# Patient Record
Sex: MALE | Race: WHITE | HISPANIC OR LATINO | Employment: UNEMPLOYED | URBAN - METROPOLITAN AREA
[De-identification: names, ages, dates, MRNs, and addresses within clinical notes are randomized per-mention and may not be internally consistent; named-entity substitution may affect disease eponyms.]

---

## 2024-08-19 ENCOUNTER — TELEPHONE (OUTPATIENT)
Dept: CARDIOLOGY | Facility: CLINIC | Age: 64
End: 2024-08-19
Payer: MEDICARE

## 2024-08-19 NOTE — TELEPHONE ENCOUNTER
Attempted to contact pt in regards to lab results pt's wife answered the phone I did aware pt's wife of lab results and she stated that I would have to call the rehab facility because he wasn't there I asked her for the number to contact them pt's wife hung up the phone.

## 2024-08-20 ENCOUNTER — HOSPITAL ENCOUNTER (INPATIENT)
Facility: HOSPITAL | Age: 64
LOS: 2 days | Discharge: HOME HEALTH | DRG: 291 | End: 2024-08-22
Attending: EMERGENCY MEDICINE | Admitting: EMERGENCY MEDICINE
Payer: MEDICARE

## 2024-08-20 DIAGNOSIS — I50.43 ACUTE ON CHRONIC COMBINED SYSTOLIC AND DIASTOLIC CONGESTIVE HEART FAILURE: ICD-10-CM

## 2024-08-20 DIAGNOSIS — E87.70 HYPERVOLEMIA, UNSPECIFIED HYPERVOLEMIA TYPE: Primary | ICD-10-CM

## 2024-08-20 DIAGNOSIS — E78.5 HYPERLIPIDEMIA ASSOCIATED WITH TYPE 2 DIABETES MELLITUS: ICD-10-CM

## 2024-08-20 DIAGNOSIS — N18.6 ESRD NEEDING DIALYSIS: ICD-10-CM

## 2024-08-20 DIAGNOSIS — K21.9 GASTROESOPHAGEAL REFLUX DISEASE: ICD-10-CM

## 2024-08-20 DIAGNOSIS — I10 ESSENTIAL HYPERTENSION: ICD-10-CM

## 2024-08-20 DIAGNOSIS — R06.02 SOB (SHORTNESS OF BREATH): ICD-10-CM

## 2024-08-20 DIAGNOSIS — I48.20 CHRONIC ATRIAL FIBRILLATION: ICD-10-CM

## 2024-08-20 DIAGNOSIS — Z99.2 ESRD NEEDING DIALYSIS: ICD-10-CM

## 2024-08-20 DIAGNOSIS — E11.69 HYPERLIPIDEMIA ASSOCIATED WITH TYPE 2 DIABETES MELLITUS: ICD-10-CM

## 2024-08-20 LAB
ALBUMIN SERPL BCP-MCNC: 3.5 G/DL (ref 3.5–5.2)
ALP SERPL-CCNC: 109 U/L (ref 55–135)
ALT SERPL W/O P-5'-P-CCNC: 17 U/L (ref 10–44)
ANION GAP SERPL CALC-SCNC: 17 MMOL/L (ref 8–16)
AST SERPL-CCNC: 18 U/L (ref 10–40)
BASOPHILS # BLD AUTO: 0.06 K/UL (ref 0–0.2)
BASOPHILS NFR BLD: 1.1 % (ref 0–1.9)
BILIRUB SERPL-MCNC: 0.7 MG/DL (ref 0.1–1)
BNP SERPL-MCNC: >4900 PG/ML (ref 0–99)
BUN SERPL-MCNC: 26 MG/DL (ref 8–23)
CALCIUM SERPL-MCNC: 10.3 MG/DL (ref 8.7–10.5)
CHLORIDE SERPL-SCNC: 98 MMOL/L (ref 95–110)
CO2 SERPL-SCNC: 23 MMOL/L (ref 23–29)
CREAT SERPL-MCNC: 4.9 MG/DL (ref 0.5–1.4)
DIFFERENTIAL METHOD BLD: ABNORMAL
EOSINOPHIL # BLD AUTO: 0.4 K/UL (ref 0–0.5)
EOSINOPHIL NFR BLD: 7 % (ref 0–8)
ERYTHROCYTE [DISTWIDTH] IN BLOOD BY AUTOMATED COUNT: 16.4 % (ref 11.5–14.5)
EST. GFR  (NO RACE VARIABLE): 12 ML/MIN/1.73 M^2
GLUCOSE SERPL-MCNC: 124 MG/DL (ref 70–110)
HCT VFR BLD AUTO: 33.4 % (ref 40–54)
HGB BLD-MCNC: 10.7 G/DL (ref 14–18)
IMM GRANULOCYTES # BLD AUTO: 0.01 K/UL (ref 0–0.04)
IMM GRANULOCYTES NFR BLD AUTO: 0.2 % (ref 0–0.5)
LYMPHOCYTES # BLD AUTO: 1 K/UL (ref 1–4.8)
LYMPHOCYTES NFR BLD: 19.3 % (ref 18–48)
MAGNESIUM SERPL-MCNC: 2.3 MG/DL (ref 1.6–2.6)
MCH RBC QN AUTO: 32.3 PG (ref 27–31)
MCHC RBC AUTO-ENTMCNC: 32 G/DL (ref 32–36)
MCV RBC AUTO: 101 FL (ref 82–98)
MONOCYTES # BLD AUTO: 0.6 K/UL (ref 0.3–1)
MONOCYTES NFR BLD: 10.7 % (ref 4–15)
NEUTROPHILS # BLD AUTO: 3.3 K/UL (ref 1.8–7.7)
NEUTROPHILS NFR BLD: 61.7 % (ref 38–73)
NRBC BLD-RTO: 0 /100 WBC
PHOSPHATE SERPL-MCNC: 3.3 MG/DL (ref 2.7–4.5)
PLATELET # BLD AUTO: 274 K/UL (ref 150–450)
PMV BLD AUTO: 9.6 FL (ref 9.2–12.9)
POCT GLUCOSE: 124 MG/DL (ref 70–110)
POTASSIUM SERPL-SCNC: 4.2 MMOL/L (ref 3.5–5.1)
PROT SERPL-MCNC: 7.6 G/DL (ref 6–8.4)
RBC # BLD AUTO: 3.31 M/UL (ref 4.6–6.2)
SODIUM SERPL-SCNC: 138 MMOL/L (ref 136–145)
TROPONIN I SERPL DL<=0.01 NG/ML-MCNC: 0.08 NG/ML (ref 0–0.03)
WBC # BLD AUTO: 5.4 K/UL (ref 3.9–12.7)

## 2024-08-20 PROCEDURE — 85025 COMPLETE CBC W/AUTO DIFF WBC: CPT | Performed by: EMERGENCY MEDICINE

## 2024-08-20 PROCEDURE — 25000003 PHARM REV CODE 250: Performed by: NURSE PRACTITIONER

## 2024-08-20 PROCEDURE — 80053 COMPREHEN METABOLIC PANEL: CPT | Performed by: EMERGENCY MEDICINE

## 2024-08-20 PROCEDURE — 5A1D70Z PERFORMANCE OF URINARY FILTRATION, INTERMITTENT, LESS THAN 6 HOURS PER DAY: ICD-10-PCS | Performed by: INTERNAL MEDICINE

## 2024-08-20 PROCEDURE — 93010 ELECTROCARDIOGRAM REPORT: CPT | Mod: ,,, | Performed by: INTERNAL MEDICINE

## 2024-08-20 PROCEDURE — 80100016 HC MAINTENANCE HEMODIALYSIS

## 2024-08-20 PROCEDURE — 11000001 HC ACUTE MED/SURG PRIVATE ROOM

## 2024-08-20 PROCEDURE — 84100 ASSAY OF PHOSPHORUS: CPT | Performed by: EMERGENCY MEDICINE

## 2024-08-20 PROCEDURE — 63600175 PHARM REV CODE 636 W HCPCS: Performed by: EMERGENCY MEDICINE

## 2024-08-20 PROCEDURE — 83735 ASSAY OF MAGNESIUM: CPT | Performed by: EMERGENCY MEDICINE

## 2024-08-20 PROCEDURE — 21400001 HC TELEMETRY ROOM

## 2024-08-20 PROCEDURE — 99285 EMERGENCY DEPT VISIT HI MDM: CPT | Mod: 25

## 2024-08-20 PROCEDURE — 93005 ELECTROCARDIOGRAM TRACING: CPT

## 2024-08-20 PROCEDURE — 83880 ASSAY OF NATRIURETIC PEPTIDE: CPT | Performed by: EMERGENCY MEDICINE

## 2024-08-20 PROCEDURE — 82962 GLUCOSE BLOOD TEST: CPT

## 2024-08-20 PROCEDURE — 99223 1ST HOSP IP/OBS HIGH 75: CPT | Mod: 25,,, | Performed by: INTERNAL MEDICINE

## 2024-08-20 PROCEDURE — 84484 ASSAY OF TROPONIN QUANT: CPT | Performed by: EMERGENCY MEDICINE

## 2024-08-20 PROCEDURE — 99222 1ST HOSP IP/OBS MODERATE 55: CPT | Mod: ,,, | Performed by: INTERNAL MEDICINE

## 2024-08-20 PROCEDURE — 25000003 PHARM REV CODE 250: Performed by: EMERGENCY MEDICINE

## 2024-08-20 RX ORDER — LOPERAMIDE HYDROCHLORIDE 2 MG/1
4 CAPSULE ORAL 2 TIMES DAILY PRN
Status: DISCONTINUED | OUTPATIENT
Start: 2024-08-20 | End: 2024-08-22 | Stop reason: HOSPADM

## 2024-08-20 RX ORDER — NAPROXEN SODIUM 220 MG/1
81 TABLET, FILM COATED ORAL DAILY
Status: DISCONTINUED | OUTPATIENT
Start: 2024-08-21 | End: 2024-08-22 | Stop reason: HOSPADM

## 2024-08-20 RX ORDER — HYDRALAZINE HYDROCHLORIDE 20 MG/ML
10 INJECTION INTRAMUSCULAR; INTRAVENOUS EVERY 4 HOURS PRN
Status: DISCONTINUED | OUTPATIENT
Start: 2024-08-20 | End: 2024-08-22 | Stop reason: HOSPADM

## 2024-08-20 RX ORDER — AMLODIPINE BESYLATE 5 MG/1
10 TABLET ORAL DAILY
Status: DISCONTINUED | OUTPATIENT
Start: 2024-08-21 | End: 2024-08-20

## 2024-08-20 RX ORDER — SODIUM CHLORIDE 9 MG/ML
INJECTION, SOLUTION INTRAVENOUS
Status: DISCONTINUED | OUTPATIENT
Start: 2024-08-20 | End: 2024-08-20

## 2024-08-20 RX ORDER — PROCHLORPERAZINE EDISYLATE 5 MG/ML
5 INJECTION INTRAMUSCULAR; INTRAVENOUS EVERY 6 HOURS PRN
Status: DISCONTINUED | OUTPATIENT
Start: 2024-08-20 | End: 2024-08-22 | Stop reason: HOSPADM

## 2024-08-20 RX ORDER — SODIUM CHLORIDE 0.9 % (FLUSH) 0.9 %
10 SYRINGE (ML) INJECTION
Status: DISCONTINUED | OUTPATIENT
Start: 2024-08-20 | End: 2024-08-22 | Stop reason: HOSPADM

## 2024-08-20 RX ORDER — AMIODARONE HYDROCHLORIDE 200 MG/1
400 TABLET ORAL DAILY
Status: DISCONTINUED | OUTPATIENT
Start: 2024-08-21 | End: 2024-08-22 | Stop reason: HOSPADM

## 2024-08-20 RX ORDER — SODIUM CHLORIDE 9 MG/ML
INJECTION, SOLUTION INTRAVENOUS ONCE
Status: DISCONTINUED | OUTPATIENT
Start: 2024-08-20 | End: 2024-08-20

## 2024-08-20 RX ORDER — QUETIAPINE FUMARATE 25 MG/1
25 TABLET, FILM COATED ORAL NIGHTLY
COMMUNITY
End: 2024-08-22

## 2024-08-20 RX ORDER — METOPROLOL TARTRATE 25 MG/1
12.5 TABLET ORAL 2 TIMES DAILY
Status: DISCONTINUED | OUTPATIENT
Start: 2024-08-20 | End: 2024-08-22 | Stop reason: HOSPADM

## 2024-08-20 RX ORDER — PANTOPRAZOLE SODIUM 40 MG/1
40 TABLET, DELAYED RELEASE ORAL DAILY
Status: DISCONTINUED | OUTPATIENT
Start: 2024-08-21 | End: 2024-08-22 | Stop reason: HOSPADM

## 2024-08-20 RX ORDER — INSULIN ASPART 100 [IU]/ML
0-5 INJECTION, SOLUTION INTRAVENOUS; SUBCUTANEOUS
Status: DISCONTINUED | OUTPATIENT
Start: 2024-08-20 | End: 2024-08-22 | Stop reason: HOSPADM

## 2024-08-20 RX ORDER — IBUPROFEN 200 MG
16 TABLET ORAL
Status: DISCONTINUED | OUTPATIENT
Start: 2024-08-20 | End: 2024-08-22 | Stop reason: HOSPADM

## 2024-08-20 RX ORDER — IBUPROFEN 200 MG
24 TABLET ORAL
Status: DISCONTINUED | OUTPATIENT
Start: 2024-08-20 | End: 2024-08-22 | Stop reason: HOSPADM

## 2024-08-20 RX ORDER — GLUCAGON 1 MG
1 KIT INJECTION
Status: DISCONTINUED | OUTPATIENT
Start: 2024-08-20 | End: 2024-08-22 | Stop reason: HOSPADM

## 2024-08-20 RX ORDER — FUROSEMIDE 10 MG/ML
120 INJECTION INTRAMUSCULAR; INTRAVENOUS
Status: DISCONTINUED | OUTPATIENT
Start: 2024-08-20 | End: 2024-08-20

## 2024-08-20 RX ORDER — ATORVASTATIN CALCIUM 40 MG/1
40 TABLET, FILM COATED ORAL DAILY
Status: DISCONTINUED | OUTPATIENT
Start: 2024-08-21 | End: 2024-08-22 | Stop reason: HOSPADM

## 2024-08-20 RX ORDER — TALC
6 POWDER (GRAM) TOPICAL NIGHTLY PRN
Status: DISCONTINUED | OUTPATIENT
Start: 2024-08-20 | End: 2024-08-22 | Stop reason: HOSPADM

## 2024-08-20 RX ORDER — QUETIAPINE FUMARATE 25 MG/1
25 TABLET, FILM COATED ORAL NIGHTLY
Status: DISCONTINUED | OUTPATIENT
Start: 2024-08-20 | End: 2024-08-22 | Stop reason: HOSPADM

## 2024-08-20 RX ORDER — SACUBITRIL AND VALSARTAN 24; 26 MG/1; MG/1
1 TABLET, FILM COATED ORAL 2 TIMES DAILY
COMMUNITY
End: 2024-08-22

## 2024-08-20 RX ORDER — LACTULOSE 10 G/15ML
15 SOLUTION ORAL EVERY 6 HOURS PRN
Status: DISCONTINUED | OUTPATIENT
Start: 2024-08-20 | End: 2024-08-22 | Stop reason: HOSPADM

## 2024-08-20 RX ORDER — CLOPIDOGREL BISULFATE 75 MG/1
75 TABLET ORAL DAILY
Status: DISCONTINUED | OUTPATIENT
Start: 2024-08-21 | End: 2024-08-22 | Stop reason: HOSPADM

## 2024-08-20 RX ORDER — AMLODIPINE BESYLATE 2.5 MG/1
2.5 TABLET ORAL DAILY
Status: DISCONTINUED | OUTPATIENT
Start: 2024-08-21 | End: 2024-08-20

## 2024-08-20 RX ORDER — ONDANSETRON HYDROCHLORIDE 2 MG/ML
4 INJECTION, SOLUTION INTRAVENOUS EVERY 6 HOURS PRN
Status: DISCONTINUED | OUTPATIENT
Start: 2024-08-20 | End: 2024-08-22 | Stop reason: HOSPADM

## 2024-08-20 RX ORDER — SEVELAMER CARBONATE 800 MG/1
800 TABLET, FILM COATED ORAL
Status: DISCONTINUED | OUTPATIENT
Start: 2024-08-20 | End: 2024-08-22 | Stop reason: HOSPADM

## 2024-08-20 RX ADMIN — QUETIAPINE FUMARATE 25 MG: 25 TABLET ORAL at 11:08

## 2024-08-20 RX ADMIN — APIXABAN 2.5 MG: 2.5 TABLET, FILM COATED ORAL at 11:08

## 2024-08-20 RX ADMIN — SACUBITRIL AND VALSARTAN 1 TABLET: 24; 26 TABLET, FILM COATED ORAL at 11:08

## 2024-08-20 RX ADMIN — LOPERAMIDE HYDROCHLORIDE 4 MG: 2 CAPSULE ORAL at 05:08

## 2024-08-20 NOTE — ASSESSMENT & PLAN NOTE
Chronic, controlled. Latest blood pressure and vitals reviewed-     Temp:  [98.3 °F (36.8 °C)]   Pulse:  [53-59]   Resp:  [18-23]   BP: (127-128)/(52-61)   SpO2:  [96 %-97 %] .   Home meds for hypertension were reviewed and noted below.   Hypertension Medications               amLODIPine (NORVASC) 10 MG tablet Take 1 tablet (10 mg total) by mouth once daily.    metoprolol tartrate (LOPRESSOR) 25 MG tablet Take 0.5 tablets (12.5 mg total) by mouth 2 (two) times daily.    sacubitriL-valsartan (ENTRESTO) 24-26 mg per tablet Take 1 tablet by mouth 2 (two) times daily.            While in the hospital, will manage blood pressure as follows; Continue home antihypertensive regimen    Will utilize p.r.n. blood pressure medication only if patient's blood pressure greater than 160/100 and he develops symptoms such as worsening chest pain or shortness of breath.    Hold Amlodipine   Cont Lopressor and Entresto

## 2024-08-20 NOTE — ASSESSMENT & PLAN NOTE
Patient with known CAD s/p CABG, which is controlled Will continue  BB, ASA, Plavix, and Statin and monitor for S/Sx of angina/ACS. Continue to monitor on telemetry.

## 2024-08-20 NOTE — CONSULTS
[facility] - Emergency Dept.  Cardiology  Consult Note    Patient Name: Silvino Robles  MRN: [id number]  Admission Date: 8/20/2024  Hospital Length of Stay: 0 days  Code Status: Full Code   Attending Provider: Jerrell Rizvi MD   Consulting Provider: David Marrero MD  Primary Care Physician: Cristiana, Primary Doctor  Principal Problem:Acute on chronic combined systolic and diastolic congestive heart failure    Patient information was obtained from patient and ER records.     Inpatient consult to Cardiology  Consult performed by: David Marrero MD  Consult ordered by: Phyllis Badillo NP        Subjective:         HPI:   Cardiology consutl for CHF exacerbation and recent s/p PCI  65 yo male, PMHx of recentl abort cardiac arrest h/o cath and later L subclaVIAN STENT, CAD s/pcabg,type 2 DM, CHFrEF, HTNa-fib-on Eliquis, ESRD on HD, nephrotic syndrome, followed by Dr. Knowles,   07/24 admitted for aborted cardiac arrest. Tht cath showed multivessel dz, patent LIMA to LAD and SVG to om1. Later stenting of Left subclavian  Echo showed EF 20%  After d/c to [facility], on HD 3 times a week and remains fluid overloaded  EKG NSR old eral infarct        Past Medical History:   Diagnosis Date    Chronic kidney disease     Diabetes mellitus     Diabetes mellitus, type 2     Hyperlipidemia     Hypertension     Other specified anemias 07/24/2024    PVD (peripheral vascular disease) 08/15/2024       Past Surgical History:   Procedure Laterality Date    ANGIOGRAM, ABDOMINAL AORTA W/ EXTREMITY RUNOFF N/A 7/23/2024    Procedure: Angiogram, Abdominal Aorta W/ Extremity Runoff;  Surgeon: Solis Carr MD;  Location: [facility] CATH LAB;  Service: Cardiology;  Laterality: N/A;    ANGIOGRAM, CORONARY, WITH LEFT HEART CATHETERIZATION N/A 7/22/2024    Procedure: Angiogram, Coronary, with Left Heart Cath;  Surgeon: Solis Carr MD;  Location: [facility] CATH LAB;  Service: Cardiology;  Laterality: N/A;    ANGIOGRAM, LOWER ARTERIAL, BILATERAL  7/22/2024    Procedure:  Angiogram, Lower Arterial, Bilateral;  Surgeon: Solis Carr MD;  Location: [facility] CATH LAB;  Service: Cardiology;;    ANGIOGRAM, LOWER ARTERIAL, UNILATERAL  7/23/2024    Procedure: Angiogram, Lower Arterial, Unilateral;  Surgeon: Solis Carr MD;  Location: [facility] CATH LAB;  Service: Cardiology;;    ARTERIOGRAPHY OF SUBCLAVIAN ARTERY  7/22/2024    Procedure: ARTERIOGRAM, SUBCLAVIAN;  Surgeon: Solis Carr MD;  Location: [facility] CATH LAB;  Service: Cardiology;;    ARTERIOGRAPHY OF SUBCLAVIAN ARTERY Left 7/23/2024    Procedure: ARTERIOGRAM, SUBCLAVIAN;  Surgeon: Solis Carr MD;  Location: [facility] CATH LAB;  Service: Cardiology;  Laterality: Left;    CORONARY BYPASS GRAFT ANGIOGRAPHY  7/22/2024    Procedure: Bypass graft study;  Surgeon: Solis Carr MD;  Location: [facility] CATH LAB;  Service: Cardiology;;    EYE SURGERY      INSERTION OF INTRAVASCULAR MICROAXIAL BLOOD PUMP N/A 7/22/2024    Procedure: INSERTION, IMPELLA;  Surgeon: Solis Carr MD;  Location: [facility] CATH LAB;  Service: Cardiology;  Laterality: N/A;    INTRAVASCULAR ULTRASOUND, NON-CORONARY  7/23/2024    Procedure: Intravascular Ultrasound, Non-Coronary;  Surgeon: Solis Carr MD;  Location: [facility] CATH LAB;  Service: Cardiology;;    PTA, SUBCLAVIAN  7/23/2024    Procedure: PTA, Subclavian;  Surgeon: Solis Carr MD;  Location: [facility] CATH LAB;  Service: Cardiology;;    REVASCULARIZATION, ENDOVASCULAR, LE W/ INTRAVASCULAR LITHOTRIPSY AND STENT PLACEMENT Left 7/23/2024    Procedure: REVASCULARIZATION, ENDOVASCULAR, LE W/ INTRAVASCULAR LITHOTRIPSY AND STENT PLACEMENT;  Surgeon: Solis Carr MD;  Location: [facility] CATH LAB;  Service: Cardiology;  Laterality: Left;  L subclavian artery    RIGHT HEART CATHETERIZATION Right 7/23/2024    Procedure: INSERTION, CATHETER, RIGHT HEART;  Surgeon: Solis Carr MD;  Location: [facility] CATH LAB;  Service: Cardiology;  Laterality: Right;    STENT, SUBCLAVIAN Left 7/23/2024    Procedure: Stent, Subclavian;   Surgeon: Solis Carr MD;  Location: [facility] CATH LAB;  Service: Cardiology;  Laterality: Left;       Review of patient's allergies indicates:   Allergen Reactions    Adhesive tape-silicones Rash       No current facility-administered medications on file prior to encounter.     Current Outpatient Medications on File Prior to Encounter   Medication Sig    amiodarone (PACERONE) 200 MG Tab Take 2 tablets (400 mg total) by mouth once daily.    amLODIPine (NORVASC) 10 MG tablet Take 1 tablet (10 mg total) by mouth once daily. (Patient taking differently: Take 5 mg by mouth once daily.)    aspirin 81 MG Chew 1 tablet    atorvastatin (LIPITOR) 40 MG tablet TAKE 1 TABLET(40 MG) BY MOUTH EVERY DAY    clopidogreL (PLAVIX) 75 mg tablet Take 1 tablet (75 mg total) by mouth once daily.    ELIQUIS 2.5 mg Tab TAKE 1 TABLET BY MOUTH TWICE DAILY    lactulose (CEPHULAC) 20 gram Pack Take 20 g by mouth once daily.    metoprolol tartrate (LOPRESSOR) 25 MG tablet Take 0.5 tablets (12.5 mg total) by mouth 2 (two) times daily.    pantoprazole (PROTONIX) 40 MG tablet TAKE 1 TABLET(40 MG) BY MOUTH EVERY DAY    QUEtiapine (SEROQUEL) 25 MG Tab Take 25 mg by mouth every evening.    sacubitriL-valsartan (ENTRESTO) 24-26 mg per tablet Take 1 tablet by mouth 2 (two) times daily.    sevelamer carbonate (RENVELA) 800 mg Tab Take 1 tablet (800 mg total) by mouth 3 (three) times daily with meals.    [DISCONTINUED] cloNIDine (CATAPRES) 0.1 MG tablet Take 1 tablet (0.1 mg total) by mouth every 4 (four) hours as needed (160). (Patient not taking: Reported on 8/15/2024)     Family History       Problem Relation (Age of Onset)    Diabetes Father, Sister, Brother          Tobacco Use    Smoking status: Never    Smokeless tobacco: Never   Substance and Sexual Activity    Alcohol use: No    Drug use: No    Sexual activity: Never     Review of Systems   Constitutional: Positive for malaise/fatigue. Negative for decreased appetite, diaphoresis, fever and  night sweats.   HENT:  Negative for nosebleeds.    Eyes:  Negative for blurred vision and double vision.   Cardiovascular:  Positive for dyspnea on exertion. Negative for chest pain, claudication, irregular heartbeat, leg swelling, near-syncope, orthopnea, palpitations, paroxysmal nocturnal dyspnea and syncope.   Respiratory:  Negative for cough, shortness of breath, sleep disturbances due to breathing, snoring, sputum production and wheezing.    Endocrine: Negative for cold intolerance and polyuria.   Hematologic/Lymphatic: Does not bruise/bleed easily.   Skin:  Negative for rash.   Musculoskeletal:  Negative for back pain, falls, joint pain, joint swelling and neck pain.   Gastrointestinal:  Negative for abdominal pain, heartburn, nausea and vomiting.   Genitourinary:  Negative for dysuria, frequency and hematuria.   Neurological:  Negative for difficulty with concentration, dizziness, focal weakness, headaches, light-headedness, numbness, seizures and weakness.   Psychiatric/Behavioral:  Negative for depression, memory loss and substance abuse. The patient does not have insomnia.    Allergic/Immunologic: Negative for HIV exposure and hives.     Objective:     Vital Signs (Most Recent):  Temp: 98.3 °F (36.8 °C) (08/20/24 1233)  Pulse: (!) 59 (08/20/24 1705)  Resp: (!) 23 (08/20/24 1705)  BP: (!) 128/55 (08/20/24 1705)  SpO2: 97 % (08/20/24 1705) Vital Signs (24h Range):  Temp:  [98.3 °F (36.8 °C)] 98.3 °F (36.8 °C)  Pulse:  [53-59] 59  Resp:  [18-23] 23  SpO2:  [96 %-97 %] 97 %  BP: (127-128)/(52-61) 128/55        There is no height or weight on file to calculate BMI.    SpO2: 97 %       No intake or output data in the 24 hours ending 08/20/24 1825    Lines/Drains/Airways       Peripheral Intravenous Line  Duration                  Hemodialysis AV Fistula Left upper arm -- days         Peripheral IV - Single Lumen 08/20/24 1628 20 G Anterior;Proximal;Right Forearm <1 day                     Physical Exam  HENT:     "  Head: Normocephalic.   Eyes:      Pupils: Pupils are equal, round, and reactive to light.   Neck:      Thyroid: No thyromegaly.      Vascular: Normal carotid pulses. No carotid bruit or JVD.   Cardiovascular:      Rate and Rhythm: Normal rate and regular rhythm. No extrasystoles are present.     Chest Wall: PMI is not displaced.      Pulses: Normal pulses.      Heart sounds: Normal heart sounds. No murmur heard.     No gallop. No S3 sounds.   Pulmonary:      Effort: No respiratory distress.      Breath sounds: Normal breath sounds. No stridor.   Abdominal:      General: Bowel sounds are normal.      Palpations: Abdomen is soft.      Tenderness: There is no abdominal tenderness. There is no rebound.   Skin:     Findings: No rash.   Neurological:      Mental Status: He is alert and oriented to person, place, and time.   Psychiatric:         Behavior: Behavior normal.          Significant Labs: ABG: No results for input(s): "PH", "PCO2", "HCO3", "POCSATURATED", "BE" in the last 48 hours., Blood Culture: No results for input(s): "LABBLOO" in the last 48 hours., BMP:   Recent Labs   Lab 08/20/24  1332   *      K 4.2   CL 98   CO2 23   BUN 26*   CREATININE 4.9*   CALCIUM 10.3   MG 2.3   , CMP   Recent Labs   Lab 08/20/24  1332      K 4.2   CL 98   CO2 23   *   BUN 26*   CREATININE 4.9*   CALCIUM 10.3   PROT 7.6   ALBUMIN 3.5   BILITOT 0.7   ALKPHOS 109   AST 18   ALT 17   ANIONGAP 17*   , CBC   Recent Labs   Lab 08/20/24  1332   WBC 5.40   HGB 10.7*   HCT 33.4*      , INR No results for input(s): "INR", "PROTIME" in the last 48 hours., Lipid Panel No results for input(s): "CHOL", "HDL", "LDLCALC", "TRIG", "CHOLHDL" in the last 48 hours., and Troponin   Recent Labs   Lab 08/20/24  1332   TROPONINI 0.079*       Significant Imaging: EKG:    Assessment and Plan:     * Acute on chronic combined systolic and diastolic congestive heart failure  Patient is identified as having Systolic (HFrEF) " heart failure that is Acute on chronic. CHF is currently uncontrolled due to Continued edema of extremities. Latest ECHO performed and demonstrates- Results for orders placed during the hospital encounter of 07/16/24    Echo    Interpretation Summary    Left Ventricle: Regional wall motion abnormalities present. Septal motion is consistent with post-operative status. There is severely reduced systolic function with a visually estimated ejection fraction of 15 - 20%. Ejection fraction by visual approximation is 20%.    There is an impella that is adequately placed.  . Continue Beta Blocker, Furosemide, and ARNI and monitor clinical status closely. Monitor on telemetry. Patient is on CHF pathway.  Monitor strict Is&Os and daily weights.  Place on fluid restriction of 1.5 L. Cardiology has been consulted. Continue to stress to patient importance of self efficacy and  on diet for CHF. Last BNP reviewed- and noted below   Recent Labs   Lab 08/20/24  1332   BNP >4,900*       CHFrEF fluid overloaded  Continue toprolXL entrestor  HD per nephrology  Fluid restriction 34 oz  Continue amio 400 mg daily and Lifevest due to recent h/o aborted cardiac arrest        Paroxysmal atrial fibrillation  On SR  Continue Eliquis 2.5 mg bid    Coronary artery disease involving coronary bypass graft of native heart  S/p cabg     Continue asa plavix and statin    ESRD needing dialysis  HD per nephrology        VTE Risk Mitigation (From admission, onward)           Ordered     apixaban tablet 2.5 mg  2 times daily         08/20/24 1654     IP VTE HIGH RISK PATIENT  Once         08/20/24 1654                    Thank you for your consult. I will follow-up with patient. Please contact us if you have any additional questions.    David Marrero MD  Cardiology   [facility] - Emergency Dept.

## 2024-08-20 NOTE — ED NOTES
Bed: 03  Expected date:   Expected time:   Means of arrival: Ambulance Service  Comments:  When clean

## 2024-08-20 NOTE — Clinical Note
Diagnosis: SOB (shortness of breath) [796026]   Reason for IP Medical Treatment  (Clinical interventions that can only be accomplished in the IP setting? ) :: Volume overload   Special Needs:: No Special Needs [1]

## 2024-08-20 NOTE — H&P
[facility]  [facility] Medicine  History & Physical    Patient Name: Silvino Robles  MRN: [id number]  Patient Class: IP- Inpatient  Admission Date: 8/20/2024  Attending Physician: Jerrell Rizvi MD   Primary Care Provider: Cristiana, Primary Doctor         Patient information was obtained from patient, spouse/SO, and ER records.     Subjective:     Principal Problem:Acute on chronic combined systolic and diastolic congestive heart failure    Chief Complaint:   Chief Complaint   Patient presents with    Shortness of Breath     Pt brought in by [facility] from [place] for SOB that worsens with activity and speaking. Pt reports right sided fractured/broken ribs from previous CPR performed twice this month. Dialysis pt, MWF schedule and last dialyzed yesterday. Pt wears a life vest for bradycardia.         HPI: The patient is a 63 yo male with DM, HTN, CAD s/p CABG, PAF-on Eliquis, ESRD on HD MWF, Vtach/NSTEMI Cardiac arrest x2 7/2024, CHF with EF 15-20%, Life vest who presented to ED with fatigue and SOB onset one week ago that gradually worsened. Pt reports right sided fracture/broken ribs from previous CPR. Pt reports compliance with HD.     In the ED, Vital signs and oxygenation stable, Mild bradycardia, labs revealed BNP >4900, trop 0.079 (down trending from last admit 7/2024), CXR- bilateral pleural effusions. EKG- sinus bradycardia, 1st degree AV block, TWI in lateral leads.     Past Medical History:   Diagnosis Date    Chronic kidney disease     Diabetes mellitus     Diabetes mellitus, type 2     Hyperlipidemia     Hypertension     Other specified anemias 07/24/2024    PVD (peripheral vascular disease) 08/15/2024       Past Surgical History:   Procedure Laterality Date    ANGIOGRAM, ABDOMINAL AORTA W/ EXTREMITY RUNOFF N/A 7/23/2024    Procedure: Angiogram, Abdominal Aorta W/ Extremity Runoff;  Surgeon: Solis Carr MD;  Location: [facility] CATH LAB;  Service: Cardiology;  Laterality: N/A;    ANGIOGRAM, CORONARY,  WITH LEFT HEART CATHETERIZATION N/A 7/22/2024    Procedure: Angiogram, Coronary, with Left Heart Cath;  Surgeon: Solis Carr MD;  Location: [facility] CATH LAB;  Service: Cardiology;  Laterality: N/A;    ANGIOGRAM, LOWER ARTERIAL, BILATERAL  7/22/2024    Procedure: Angiogram, Lower Arterial, Bilateral;  Surgeon: Solis Carr MD;  Location: [facility] CATH LAB;  Service: Cardiology;;    ANGIOGRAM, LOWER ARTERIAL, UNILATERAL  7/23/2024    Procedure: Angiogram, Lower Arterial, Unilateral;  Surgeon: Solis Carr MD;  Location: [facility] CATH LAB;  Service: Cardiology;;    ARTERIOGRAPHY OF SUBCLAVIAN ARTERY  7/22/2024    Procedure: ARTERIOGRAM, SUBCLAVIAN;  Surgeon: Solis Carr MD;  Location: [facility] CATH LAB;  Service: Cardiology;;    ARTERIOGRAPHY OF SUBCLAVIAN ARTERY Left 7/23/2024    Procedure: ARTERIOGRAM, SUBCLAVIAN;  Surgeon: Solis Carr MD;  Location: [facility] CATH LAB;  Service: Cardiology;  Laterality: Left;    CORONARY BYPASS GRAFT ANGIOGRAPHY  7/22/2024    Procedure: Bypass graft study;  Surgeon: Solis Carr MD;  Location: [facility] CATH LAB;  Service: Cardiology;;    EYE SURGERY      INSERTION OF INTRAVASCULAR MICROAXIAL BLOOD PUMP N/A 7/22/2024    Procedure: INSERTION, IMPELLA;  Surgeon: Solis Carr MD;  Location: [facility] CATH LAB;  Service: Cardiology;  Laterality: N/A;    INTRAVASCULAR ULTRASOUND, NON-CORONARY  7/23/2024    Procedure: Intravascular Ultrasound, Non-Coronary;  Surgeon: Solis Carr MD;  Location: [facility] CATH LAB;  Service: Cardiology;;    PTA, SUBCLAVIAN  7/23/2024    Procedure: PTA, Subclavian;  Surgeon: Solis Carr MD;  Location: [facility] CATH LAB;  Service: Cardiology;;    REVASCULARIZATION, ENDOVASCULAR, LE W/ INTRAVASCULAR LITHOTRIPSY AND STENT PLACEMENT Left 7/23/2024    Procedure: REVASCULARIZATION, ENDOVASCULAR, LE W/ INTRAVASCULAR LITHOTRIPSY AND STENT PLACEMENT;  Surgeon: Solis Carr MD;  Location: [facility] CATH LAB;  Service: Cardiology;  Laterality: Left;  L subclavian  artery    RIGHT HEART CATHETERIZATION Right 7/23/2024    Procedure: INSERTION, CATHETER, RIGHT HEART;  Surgeon: Solis Carr MD;  Location: [facility] CATH LAB;  Service: Cardiology;  Laterality: Right;    STENT, SUBCLAVIAN Left 7/23/2024    Procedure: Stent, Subclavian;  Surgeon: Solis Carr MD;  Location: [facility] CATH LAB;  Service: Cardiology;  Laterality: Left;       Review of patient's allergies indicates:   Allergen Reactions    Adhesive tape-silicones Rash       No current facility-administered medications on file prior to encounter.     Current Outpatient Medications on File Prior to Encounter   Medication Sig    amiodarone (PACERONE) 200 MG Tab Take 2 tablets (400 mg total) by mouth once daily.    amLODIPine (NORVASC) 10 MG tablet Take 1 tablet (10 mg total) by mouth once daily.    aspirin 81 MG Chew 1 tablet    atorvastatin (LIPITOR) 40 MG tablet TAKE 1 TABLET(40 MG) BY MOUTH EVERY DAY    cloNIDine (CATAPRES) 0.1 MG tablet Take 1 tablet (0.1 mg total) by mouth every 4 (four) hours as needed (160). (Patient not taking: Reported on 8/15/2024)    clopidogreL (PLAVIX) 75 mg tablet Take 1 tablet (75 mg total) by mouth once daily.    ELIQUIS 2.5 mg Tab TAKE 1 TABLET BY MOUTH TWICE DAILY    metoprolol tartrate (LOPRESSOR) 25 MG tablet Take 0.5 tablets (12.5 mg total) by mouth 2 (two) times daily.    pantoprazole (PROTONIX) 40 MG tablet TAKE 1 TABLET(40 MG) BY MOUTH EVERY DAY (Patient not taking: Reported on 8/15/2024)    sevelamer carbonate (RENVELA) 800 mg Tab Take 1 tablet (800 mg total) by mouth 3 (three) times daily with meals.     Family History       Problem Relation (Age of Onset)    Diabetes Father, Sister, Brother          Tobacco Use    Smoking status: Never    Smokeless tobacco: Never   Substance and Sexual Activity    Alcohol use: No    Drug use: No    Sexual activity: Never     Review of Systems   Constitutional:  Positive for activity change and fatigue. Negative for appetite change, chills,  diaphoresis and fever.   HENT:  Negative for congestion, nosebleeds, sore throat and trouble swallowing.    Eyes:  Negative for pain, discharge and visual disturbance.   Respiratory:  Positive for shortness of breath. Negative for apnea, cough, chest tightness, wheezing and stridor.    Cardiovascular:  Positive for leg swelling. Negative for chest pain and palpitations.   Gastrointestinal:  Positive for diarrhea (onset today after given Lactulose). Negative for abdominal distention, abdominal pain, blood in stool, constipation, nausea and vomiting.   Endocrine: Negative for cold intolerance and heat intolerance.   Genitourinary:  Negative for difficulty urinating, dysuria, flank pain, frequency and urgency.   Musculoskeletal:  Negative for arthralgias, back pain, joint swelling, myalgias, neck pain and neck stiffness.   Skin:  Negative for rash and wound.   Allergic/Immunologic: Negative for food allergies and immunocompromised state.   Neurological:  Positive for weakness. Negative for dizziness, seizures, syncope, facial asymmetry, light-headedness and headaches.   Hematological:  Negative for adenopathy.   Psychiatric/Behavioral:  Negative for agitation, behavioral problems and confusion. The patient is not nervous/anxious.      Objective:     Vital Signs (Most Recent):  Temp: 98.3 °F (36.8 °C) (08/20/24 1233)  Pulse: (!) 59 (08/20/24 1705)  Resp: (!) 23 (08/20/24 1705)  BP: (!) 128/55 (08/20/24 1705)  SpO2: 97 % (08/20/24 1705) Vital Signs (24h Range):  Temp:  [98.3 °F (36.8 °C)] 98.3 °F (36.8 °C)  Pulse:  [53-59] 59  Resp:  [18-23] 23  SpO2:  [96 %-97 %] 97 %  BP: (127-128)/(52-61) 128/55        There is no height or weight on file to calculate BMI.     Physical Exam  Vitals and nursing note reviewed.   Constitutional:       General: He is not in acute distress.     Appearance: He is well-developed. He is not diaphoretic.   HENT:      Head: Normocephalic and atraumatic.      Nose: Nose normal.   Eyes:       General: No scleral icterus.     Conjunctiva/sclera: Conjunctivae normal.   Cardiovascular:      Rate and Rhythm: Regular rhythm. Bradycardia present.      Heart sounds: Normal heart sounds. No murmur heard.     No friction rub. No gallop.      Comments: Rales, diminished to bases  Pulmonary:      Effort: Tachypnea and accessory muscle usage present. No respiratory distress.      Breath sounds: Normal breath sounds. No stridor. No wheezing or rales.      Comments: +conversational dyspnea   Chest:      Chest wall: No tenderness.   Abdominal:      General: Bowel sounds are normal. There is no distension.      Palpations: Abdomen is soft.      Tenderness: There is no abdominal tenderness. There is no guarding or rebound.   Musculoskeletal:         General: No tenderness or deformity. Normal range of motion.      Cervical back: Normal range of motion and neck supple.      Right lower leg: Edema (trace) present.      Left lower leg: Edema (trace) present.   Skin:     General: Skin is warm and dry.      Coloration: Skin is not pale.      Findings: No erythema or rash.   Neurological:      Mental Status: He is alert and oriented to person, place, and time.      Cranial Nerves: No cranial nerve deficit.      Motor: No abnormal muscle tone.      Coordination: Coordination normal.   Psychiatric:         Behavior: Behavior normal.         Thought Content: Thought content normal.                Significant Labs: All pertinent labs within the past 24 hours have been reviewed.    Significant Imaging: I have reviewed all pertinent imaging results/findings within the past 24 hours.  Assessment/Plan:     * Acute on chronic combined systolic and diastolic congestive heart failure  Patient is identified as having Combined Systolic and Diastolic heart failure that is Acute on chronic. CHF is currently uncontrolled due to Rales/crackles on pulmonary exam and Pulmonary edema/pleural effusion on CXR. Latest ECHO performed and  demonstrates- Results for orders placed during the hospital encounter of 07/16/24    Echo    Interpretation Summary    Left Ventricle: Regional wall motion abnormalities present. Septal motion is consistent with post-operative status. There is severely reduced systolic function with a visually estimated ejection fraction of 15 - 20%. Ejection fraction by visual approximation is 20%.    There is an impella that is adequately placed.  . Continue Beta Blocker and ARNI and monitor clinical status closely. Monitor on telemetry. Patient is on CHF pathway.  Monitor strict Is&Os and daily weights.  Place on fluid restriction of 1.5 L. Cardiology has been consulted. Continue to stress to patient importance of self efficacy and  on diet for CHF. Last BNP reviewed- and noted below   Recent Labs   Lab 08/20/24  1332   BNP >4,900*     Nephrology consulted who plans for HD T, W, and Th for UF    Troponin level elevated  Serial troponin   Appears to be down trending from last admit for NSTEMI  Denies CP  Cont ASA, BB, Plavix, Statin, Entresto      Paroxysmal atrial fibrillation  Patient with Paroxysmal (<7 days) atrial fibrillation which is controlled currently with Beta Blocker and Amiodarone. Patient is currently in sinus rhythm.[id number] Score: 3. Anticoagulation indicated. Anticoagulation done with Eliquis .    Coronary artery disease involving coronary bypass graft of native heart  Patient with known CAD s/p CABG, which is controlled Will continue  BB, ASA, Plavix, and Statin and monitor for S/Sx of angina/ACS. Continue to monitor on telemetry.     ESRD needing dialysis  Creatine stable for now. BMP reviewed- noted CrCl cannot be calculated (Unknown ideal weight.). according to latest data. Based on current GFR, CKD stage is end stage.  Monitor UOP and serial BMP and adjust therapy as needed. Renally dose meds. Avoid nephrotoxic medications and procedures.    Nephrology consulted and recommends HD for next 3 days for  "UF    Type 2 diabetes mellitus with neurologic complication, without long-term current use of insulin  Patient's FSGs are controlled on current medication regimen.  Last A1c reviewed-   Lab Results   Component Value Date    HGBA1C 5.6 06/19/2024     Most recent fingerstick glucose reviewed- No results for input(s): "POCTGLUCOSE" in the last 24 hours.  Current correctional scale  Low  Maintain anti-hyperglycemic dose as follows-   Antihyperglycemics (From admission, onward)      Start     Stop Route Frequency Ordered    08/20/24 1755  insulin aspart U-100 pen 0-5 Units         -- SubQ Before meals & nightly PRN 08/20/24 1656          Hold Oral hypoglycemics while patient is in the hospital.       Primary hypertension  Chronic, controlled. Latest blood pressure and vitals reviewed-     Temp:  [98.3 °F (36.8 °C)]   Pulse:  [53-59]   Resp:  [18-23]   BP: (127-128)/(52-61)   SpO2:  [96 %-97 %] .   Home meds for hypertension were reviewed and noted below.   Hypertension Medications               amLODIPine (NORVASC) 10 MG tablet Take 1 tablet (10 mg total) by mouth once daily.    metoprolol tartrate (LOPRESSOR) 25 MG tablet Take 0.5 tablets (12.5 mg total) by mouth 2 (two) times daily.    sacubitriL-valsartan (ENTRESTO) 24-26 mg per tablet Take 1 tablet by mouth 2 (two) times daily.            While in the hospital, will manage blood pressure as follows; Continue home antihypertensive regimen    Will utilize p.r.n. blood pressure medication only if patient's blood pressure greater than 160/100 and he develops symptoms such as worsening chest pain or shortness of breath.    Hold Amlodipine   Cont Lopressor and Entresto       VTE Risk Mitigation (From admission, onward)           Ordered     apixaban tablet 2.5 mg  2 times daily         08/20/24 1654     IP VTE HIGH RISK PATIENT  Once         08/20/24 1654                                    Phyllis Badillo NP  Department of Hospital Medicine  [facility] - Emergency " Dept.

## 2024-08-20 NOTE — ASSESSMENT & PLAN NOTE
"Patient's FSGs are controlled on current medication regimen.  Last A1c reviewed-   Lab Results   Component Value Date    HGBA1C 5.6 06/19/2024     Most recent fingerstick glucose reviewed- No results for input(s): "POCTGLUCOSE" in the last 24 hours.  Current correctional scale  Low  Maintain anti-hyperglycemic dose as follows-   Antihyperglycemics (From admission, onward)      Start     Stop Route Frequency Ordered    08/20/24 1755  insulin aspart U-100 pen 0-5 Units         -- SubQ Before meals & nightly PRN 08/20/24 1656          Hold Oral hypoglycemics while patient is in the hospital.     "

## 2024-08-20 NOTE — ED PROVIDER NOTES
SCRIBE #1 NOTE: I, Dallin Garibay, am scribing for, and in the presence of, Jerrell Rizvi MD. I have scribed the entire note.       History     Chief Complaint   Patient presents with    Shortness of Breath     Pt brought in by MT from [place] for SOB that worsens with activity and speaking. Pt reports right sided fractured/broken ribs from previous CPR performed twice this month. Dialysis pt, MWF schedule and last dialyzed yesterday. Pt wears a life vest for bradycardia.      Review of patient's allergies indicates:   Allergen Reactions    Adhesive tape-silicones Rash         History of Present Illness     HPI    8/20/2024, 1:35 PM  History obtained from the patient      History of Present Illness: Silvino Robles is a 64 y.o. male patient with a PMHx of DM Type 2, HTN, chronic kidney disease, HLD, and PVD who presents to the Emergency Department for evaluation of SOB which onset gradually the last few days. Pt was sent from [facility] for SOB that worsen with activity and speaking. Pt also notes that he was told he had fluids in his lungs by Dr Carr. Pt was told by Dr Carr on 8/17 that he recommends getting daily dialysis at [facility], however, they are unable to do is currently due to not having appropriate orders in. Pt is currently getting dialysis every M W F. Symptoms are constant and moderate in severity. Associated sxs include cough. Patient denies any chills, HA, abdominal pain, CP, and all other sxs at this time. No prior Tx reported. Pt reports right sided fracture/broken ribs from previous CPR performed twice this month. Pt wears a life vest for bradycardia. No further complaints or concerns at this time.       Arrival mode: AASI    PCP: No, Primary Doctor        Past Medical History:  Past Medical History:   Diagnosis Date    Chronic kidney disease     Diabetes mellitus     Diabetes mellitus, type 2     Hyperlipidemia     Hypertension     Other specified anemias 07/24/2024    PVD (peripheral vascular disease)  08/15/2024       Past Surgical History:  Past Surgical History:   Procedure Laterality Date    ANGIOGRAM, ABDOMINAL AORTA W/ EXTREMITY RUNOFF N/A 7/23/2024    Procedure: Angiogram, Abdominal Aorta W/ Extremity Runoff;  Surgeon: Solis Carr MD;  Location: [facility] CATH LAB;  Service: Cardiology;  Laterality: N/A;    ANGIOGRAM, CORONARY, WITH LEFT HEART CATHETERIZATION N/A 7/22/2024    Procedure: Angiogram, Coronary, with Left Heart Cath;  Surgeon: Solis Carr MD;  Location: [facility] CATH LAB;  Service: Cardiology;  Laterality: N/A;    ANGIOGRAM, LOWER ARTERIAL, BILATERAL  7/22/2024    Procedure: Angiogram, Lower Arterial, Bilateral;  Surgeon: Solis Carr MD;  Location: [facility] CATH LAB;  Service: Cardiology;;    ANGIOGRAM, LOWER ARTERIAL, UNILATERAL  7/23/2024    Procedure: Angiogram, Lower Arterial, Unilateral;  Surgeon: Solis Carr MD;  Location: [facility] CATH LAB;  Service: Cardiology;;    ARTERIOGRAPHY OF SUBCLAVIAN ARTERY  7/22/2024    Procedure: ARTERIOGRAM, SUBCLAVIAN;  Surgeon: Solis Carr MD;  Location: [facility] CATH LAB;  Service: Cardiology;;    ARTERIOGRAPHY OF SUBCLAVIAN ARTERY Left 7/23/2024    Procedure: ARTERIOGRAM, SUBCLAVIAN;  Surgeon: Solis Carr MD;  Location: [facility] CATH LAB;  Service: Cardiology;  Laterality: Left;    CORONARY BYPASS GRAFT ANGIOGRAPHY  7/22/2024    Procedure: Bypass graft study;  Surgeon: Solis Carr MD;  Location: [facility] CATH LAB;  Service: Cardiology;;    EYE SURGERY      INSERTION OF INTRAVASCULAR MICROAXIAL BLOOD PUMP N/A 7/22/2024    Procedure: INSERTION, IMPELLA;  Surgeon: Solis Carr MD;  Location: [facility] CATH LAB;  Service: Cardiology;  Laterality: N/A;    INTRAVASCULAR ULTRASOUND, NON-CORONARY  7/23/2024    Procedure: Intravascular Ultrasound, Non-Coronary;  Surgeon: Solis Carr MD;  Location: [facility] CATH LAB;  Service: Cardiology;;    PTA, SUBCLAVIAN  7/23/2024    Procedure: PTA, Subclavian;  Surgeon: Solis Carr MD;  Location: [facility] CATH LAB;   Service: Cardiology;;    REVASCULARIZATION, ENDOVASCULAR, LE W/ INTRAVASCULAR LITHOTRIPSY AND STENT PLACEMENT Left 7/23/2024    Procedure: REVASCULARIZATION, ENDOVASCULAR, LE W/ INTRAVASCULAR LITHOTRIPSY AND STENT PLACEMENT;  Surgeon: Solis Carr MD;  Location: [facility] CATH LAB;  Service: Cardiology;  Laterality: Left;  L subclavian artery    RIGHT HEART CATHETERIZATION Right 7/23/2024    Procedure: INSERTION, CATHETER, RIGHT HEART;  Surgeon: Solis Carr MD;  Location: [facility] CATH LAB;  Service: Cardiology;  Laterality: Right;    STENT, SUBCLAVIAN Left 7/23/2024    Procedure: Stent, Subclavian;  Surgeon: Solis Carr MD;  Location: [facility] CATH LAB;  Service: Cardiology;  Laterality: Left;         Family History:  Family History   Problem Relation Name Age of Onset    Diabetes Father      Diabetes Sister      Diabetes Brother         Social History:  Social History     Tobacco Use    Smoking status: Never    Smokeless tobacco: Never   Substance and Sexual Activity    Alcohol use: No    Drug use: No    Sexual activity: Never        Review of Systems     Review of Systems   Constitutional:  Negative for chills and fever.   HENT:  Negative for sore throat.    Respiratory:  Positive for cough and shortness of breath.    Cardiovascular:  Negative for chest pain.   Gastrointestinal:  Negative for abdominal pain and nausea.   Genitourinary:  Negative for dysuria.   Musculoskeletal:  Negative for back pain.   Skin:  Negative for rash.   Neurological:  Negative for weakness, light-headedness and headaches.   Hematological:  Does not bruise/bleed easily.   All other systems reviewed and are negative.     Physical Exam     Initial Vitals [08/20/24 1233]   BP Pulse Resp Temp SpO2   (!) 127/52 (!) 53 18 98.3 °F (36.8 °C) 96 %      MAP       --          Physical Exam  Nursing Notes and Vital Signs Reviewed.  Constitutional: Patient is in no acute distress. Well-developed and well-nourished.  Head: Atraumatic.  Normocephalic.  Eyes: PERRL. EOM intact. Conjunctivae are not pale. No scleral icterus.  ENT: Mucous membranes are moist. Oropharynx is clear and symmetric.    Neck: Supple. Full ROM. No lymphadenopathy.  Cardiovascular: Regular rate. Regular rhythm. No murmurs, rubs, or gallops. Distal pulses are 2+ and symmetric. Has a life vest on.   Pulmonary/Chest: Faint crackles in bilateral lower lobes. Decreased breath sounds.   Abdominal: Soft and non-distended.  There is no tenderness.  No rebound, guarding, or rigidity. Good bowel sounds. Dialysis stunt in left upper extremities with good thrill and bruit.   Genitourinary: No CVA tenderness  Musculoskeletal: Moves all extremities. No obvious deformities. No edema. No calf tenderness.  Skin: Warm and dry.  Neurological:  Alert, awake, and appropriate.  Normal speech.  No acute focal neurological deficits are appreciated.  Psychiatric: Normal affect. Good eye contact. Appropriate in content.     ED Course   Procedures  ED Vital Signs:  Vitals:    08/20/24 1233 08/20/24 1335 08/20/24 1500   BP: (!) 127/52 (!) 128/59 128/61   Pulse: (!) 53 (!) 56 (!) 56   Resp: 18 18 18   Temp: 98.3 °F (36.8 °C)     TempSrc: Oral     SpO2: 96% 97% 96%       Abnormal Lab Results:  Labs Reviewed   CBC W/ AUTO DIFFERENTIAL - Abnormal       Result Value    WBC 5.40      RBC 3.31 (*)     Hemoglobin 10.7 (*)     Hematocrit 33.4 (*)      (*)     MCH 32.3 (*)     MCHC 32.0      RDW 16.4 (*)     Platelets 274      MPV 9.6      Immature Granulocytes 0.2      Gran # (ANC) 3.3      Immature Grans (Abs) 0.01      Lymph # 1.0      Mono # 0.6      Eos # 0.4      Baso # 0.06      nRBC 0      Gran % 61.7      Lymph % 19.3      Mono % 10.7      Eosinophil % 7.0      Basophil % 1.1      Differential Method Automated     COMPREHENSIVE METABOLIC PANEL - Abnormal    Sodium 138      Potassium 4.2      Chloride 98      CO2 23      Glucose 124 (*)     BUN 26 (*)     Creatinine 4.9 (*)     Calcium 10.3       Total Protein 7.6      Albumin 3.5      Total Bilirubin 0.7      Alkaline Phosphatase 109      AST 18      ALT 17      eGFR 12 (*)     Anion Gap 17 (*)    B-TYPE NATRIURETIC PEPTIDE - Abnormal    BNP >4,900 (*)    TROPONIN I - Abnormal    Troponin I 0.079 (*)    MAGNESIUM    Magnesium 2.3     PHOSPHORUS    Phosphorus 3.3          All Lab Results:  Results for orders placed or performed during the hospital encounter of 08/20/24   CBC auto differential   Result Value Ref Range    WBC 5.40 3.90 - 12.70 K/uL    RBC 3.31 (L) 4.60 - 6.20 M/uL    Hemoglobin 10.7 (L) 14.0 - 18.0 g/dL    Hematocrit 33.4 (L) 40.0 - 54.0 %     (H) 82 - 98 fL    MCH 32.3 (H) 27.0 - 31.0 pg    MCHC 32.0 32.0 - 36.0 g/dL    RDW 16.4 (H) 11.5 - 14.5 %    Platelets 274 150 - 450 K/uL    MPV 9.6 9.2 - 12.9 fL    Immature Granulocytes 0.2 0.0 - 0.5 %    Gran # (ANC) 3.3 1.8 - 7.7 K/uL    Immature Grans (Abs) 0.01 0.00 - 0.04 K/uL    Lymph # 1.0 1.0 - 4.8 K/uL    Mono # 0.6 0.3 - 1.0 K/uL    Eos # 0.4 0.0 - 0.5 K/uL    Baso # 0.06 0.00 - 0.20 K/uL    nRBC 0 0 /100 WBC    Gran % 61.7 38.0 - 73.0 %    Lymph % 19.3 18.0 - 48.0 %    Mono % 10.7 4.0 - 15.0 %    Eosinophil % 7.0 0.0 - 8.0 %    Basophil % 1.1 0.0 - 1.9 %    Differential Method Automated    Comprehensive metabolic panel   Result Value Ref Range    Sodium 138 136 - 145 mmol/L    Potassium 4.2 3.5 - 5.1 mmol/L    Chloride 98 95 - 110 mmol/L    CO2 23 23 - 29 mmol/L    Glucose 124 (H) 70 - 110 mg/dL    BUN 26 (H) 8 - 23 mg/dL    Creatinine 4.9 (H) 0.5 - 1.4 mg/dL    Calcium 10.3 8.7 - 10.5 mg/dL    Total Protein 7.6 6.0 - 8.4 g/dL    Albumin 3.5 3.5 - 5.2 g/dL    Total Bilirubin 0.7 0.1 - 1.0 mg/dL    Alkaline Phosphatase 109 55 - 135 U/L    AST 18 10 - 40 U/L    ALT 17 10 - 44 U/L    eGFR 12 (A) >60 mL/min/1.73 m^2    Anion Gap 17 (H) 8 - 16 mmol/L   Magnesium   Result Value Ref Range    Magnesium 2.3 1.6 - 2.6 mg/dL   Phosphorus   Result Value Ref Range    Phosphorus 3.3 2.7 - 4.5 mg/dL    Brain natriuretic peptide   Result Value Ref Range    BNP >4,900 (H) 0 - 99 pg/mL   Troponin I   Result Value Ref Range    Troponin I 0.079 (H) 0.000 - 0.026 ng/mL   EKG 12-lead   Result Value Ref Range    QRS Duration 102 ms    OHS QTC Calculation 461 ms         Imaging Results:  Imaging Results              X-Ray Chest AP Portable (Final result)  Result time 08/20/24 14:27:50      Final result by Jameel Jose MD (08/20/24 14:27:50)                   Impression:      Stable x-ray.      Electronically signed by: Jameel Jose MD  Date:    08/20/2024  Time:    14:27               Narrative:    EXAMINATION:  XR CHEST AP PORTABLE    CLINICAL HISTORY:  Respiratory distress., SOB;    COMPARISON:  08/15/2024 x-ray    FINDINGS:  External life vest defibrillator is present.    Sternal wires.  Left axillary surgical clips.    The cardiac size is enlarged.    Small right pleural effusion is present.    Persistent left pleural effusion, probably loculated.                                       The EKG was ordered, reviewed, and independently interpreted by the ED provider.  Interpretation time: 13:27  Rate: 57 BPM  Rhythm: Sinus bradycardia with 1st degree A-V block.  Interpretation: Low voltage QRS. Septal infarct (cited on or before 28-JUL-2024). ST and T wave abnormality, consider lateral ischemia. No STEMI.           The Emergency Provider reviewed the vital signs and test results, which are outlined above.     ED Discussion     3:21 PM: Discussed pt's case with Dr. Marrero (Cardiology) who recommends admitting pt for hemodialysis since pt is a high cv risk pt and fluid is overloaded.    3:23 PM: Discussed case with Meme Cardozo NP (Hospital Medicine). Meme Cardozo NP agrees with current care and management of pt and accepts admission.   Admitting Service: Hospital Medicine   Admitting Physician: Dr. Aguero  Admit to: inpatient: med/tele    3:23 PM: Re-evaluated pt. I have discussed test results, shared  treatment plan, and the need for admission with patient and family at bedside. Pt and family express understanding at this time and agree with all information. All questions answered. Pt and family have no further questions or concerns at this time. Pt is ready for admit.         Medical Decision Making  Amount and/or Complexity of Data Reviewed  Labs: ordered. Decision-making details documented in ED Course.  Radiology: ordered. Decision-making details documented in ED Course.  ECG/medicine tests: ordered and independent interpretation performed. Decision-making details documented in ED Course.    Risk  Decision regarding hospitalization.                ED Medication(s):  Medications   furosemide injection 120 mg (has no administration in time range)       New Prescriptions    No medications on file               Scribe Attestation:   Scribe #1: I performed the above scribed service and the documentation accurately describes the services I performed. I attest to the accuracy of the note.     Attending:   Physician Attestation Statement for Scribe #1: I, Jerrell Rizvi MD, personally performed the services described in this documentation, as scribed by Dallin Garibay, in my presence, and it is both accurate and complete.           Clinical Impression       ICD-10-CM ICD-9-CM   1. Hypervolemia, unspecified hypervolemia type  E87.70 276.69   2. SOB (shortness of breath)  R06.02 786.05   3. ESRD needing dialysis  N18.6 585.6    Z99.2        Disposition:   Disposition: Admitted  Condition: Stable         Jerrell Rizvi MD  08/20/24 6640

## 2024-08-20 NOTE — NURSING
Pt arrived at dialysis, tele monitor placed ([id number]). Pt's wife in the room. Called kitchen for dinner tray, received no response, will try again later.

## 2024-08-20 NOTE — ASSESSMENT & PLAN NOTE
Creatine stable for now. BMP reviewed- noted CrCl cannot be calculated (Unknown ideal weight.). according to latest data. Based on current GFR, CKD stage is end stage.  Monitor UOP and serial BMP and adjust therapy as needed. Renally dose meds. Avoid nephrotoxic medications and procedures.    Nephrology consulted and recommends HD for next 3 days for UF

## 2024-08-20 NOTE — ASSESSMENT & PLAN NOTE
Patient is identified as having Systolic (HFrEF) heart failure that is Acute on chronic. CHF is currently uncontrolled due to Continued edema of extremities. Latest ECHO performed and demonstrates- Results for orders placed during the hospital encounter of 07/16/24    Echo    Interpretation Summary    Left Ventricle: Regional wall motion abnormalities present. Septal motion is consistent with post-operative status. There is severely reduced systolic function with a visually estimated ejection fraction of 15 - 20%. Ejection fraction by visual approximation is 20%.    There is an impella that is adequately placed.  . Continue Beta Blocker, Furosemide, and ARNI and monitor clinical status closely. Monitor on telemetry. Patient is on CHF pathway.  Monitor strict Is&Os and daily weights.  Place on fluid restriction of 1.5 L. Cardiology has been consulted. Continue to stress to patient importance of self efficacy and  on diet for CHF. Last BNP reviewed- and noted below   Recent Labs   Lab 08/20/24  1332   BNP >4,900*       CHFrEF fluid overloaded  Continue toprolXL entrestor  HD per nephrology  Fluid restriction 34 oz  Continue amio 400 mg daily and Lifevest due to recent h/o aborted cardiac arrest

## 2024-08-20 NOTE — HPI
Cardiology consutl for CHF exacerbation and recent s/p PCI  63 yo male, PMHx of recentl abort cardiac arrest h/o cath and later L subclaVIAN STENT, CAD s/pcabg,type 2 DM, CHFrEF, HTNa-fib-on Eliquis, ESRD on HD, nephrotic syndrome, followed by Dr. Knowles,   07/24 admitted for aborted cardiac arrest. Tht cath showed multivessel dz, patent LIMA to LAD and SVG to om1. Later stenting of Left subclavian  Echo showed EF 20%  After d/c to [facility], on HD 3 times a week and remains fluid overloaded  EKG NSR old real infarct

## 2024-08-20 NOTE — CONSULTS
Nephrology Consultation  Consulting Physician:  Dr. Jerrell Rizvi MD  Reason for consultation:  ESRD management including dialysis services.  Volume overload  Informants:  Patient     History of Present Illness   The patient is a 64 y.o. male with a hx of ESRD currently on dialysis MWF at [facility] while he has [facility] but typically TTS at [facility].  He presents to the ER due to shortness of breath and at the behest of a certain Dr. Carr due to pulmonary edema on a recent chest x-ray.  He had recently suffered a cardiac arrest after dialysis and has a history of severe native vessel coronary artery disease with a history of bypass graft in the past recent stent placement prior to that cardiac arrest.  He also has a life vest in place.  Therefore, it was recommended that he get daily dialysis.  However, this was not relayed to a nephrologist in the such this did not happen.  He is also to be discharged from [facility] tomorrow and [facility]  Therefore, it would be hard to get him dialyzed serially in the outpatient setting with that transition.  He otherwise denies any chest pain, nausea/vomiting, abdominal pain or diarrhea.  He does feel dyspneic and has had worsening lower extremity swelling as well.  No fevers chills or night sweats either.    I have reviewed the [facility] EMR.  He dialyzes 4 hours on a 20-51 bath with a bicarbonate setting of 35 mEq and receives heparin 7000 units bolus with each treatment.  His dry weight is 76 kg and he left at 79.7 kg on 08/19.  He appears to be compliant with both time and frequency of dialysis.  He does have hypotension on dialysis.  His last hemoglobin was 9.3 on 08/14 and he does receive Mircera 50 mcg every 4 weeks with his last dose being 08/07/2024.  His last intact PTH was 383 this month and he is on cinacalcet 120 mg with each treatment as well as calcitriol 0.75 mcg with each treatment.  He takes Renvela  for his binder.        PMHx:    Past Medical History:   Diagnosis Date    Chronic kidney disease     Diabetes mellitus     Diabetes mellitus, type 2     Hyperlipidemia     Hypertension     Other specified anemias 07/24/2024    PVD (peripheral vascular disease) 08/15/2024         PSHx:  Past Surgical History:   Procedure Laterality Date    ANGIOGRAM, ABDOMINAL AORTA W/ EXTREMITY RUNOFF N/A 7/23/2024    Procedure: Angiogram, Abdominal Aorta W/ Extremity Runoff;  Surgeon: Solis Carr MD;  Location: [facility] CATH LAB;  Service: Cardiology;  Laterality: N/A;    ANGIOGRAM, CORONARY, WITH LEFT HEART CATHETERIZATION N/A 7/22/2024    Procedure: Angiogram, Coronary, with Left Heart Cath;  Surgeon: Solis Carr MD;  Location: [facility] CATH LAB;  Service: Cardiology;  Laterality: N/A;    ANGIOGRAM, LOWER ARTERIAL, BILATERAL  7/22/2024    Procedure: Angiogram, Lower Arterial, Bilateral;  Surgeon: Solis Carr MD;  Location: [facility] CATH LAB;  Service: Cardiology;;    ANGIOGRAM, LOWER ARTERIAL, UNILATERAL  7/23/2024    Procedure: Angiogram, Lower Arterial, Unilateral;  Surgeon: Solis Carr MD;  Location: [facility] CATH LAB;  Service: Cardiology;;    ARTERIOGRAPHY OF SUBCLAVIAN ARTERY  7/22/2024    Procedure: ARTERIOGRAM, SUBCLAVIAN;  Surgeon: Solis Carr MD;  Location: [facility] CATH LAB;  Service: Cardiology;;    ARTERIOGRAPHY OF SUBCLAVIAN ARTERY Left 7/23/2024    Procedure: ARTERIOGRAM, SUBCLAVIAN;  Surgeon: Solis Carr MD;  Location: [facility] CATH LAB;  Service: Cardiology;  Laterality: Left;    CORONARY BYPASS GRAFT ANGIOGRAPHY  7/22/2024    Procedure: Bypass graft study;  Surgeon: Solis Carr MD;  Location: [facility] CATH LAB;  Service: Cardiology;;    EYE SURGERY      INSERTION OF INTRAVASCULAR MICROAXIAL BLOOD PUMP N/A 7/22/2024    Procedure: INSERTION, IMPELLA;  Surgeon: Solis Carr MD;  Location: [facility] CATH LAB;  Service: Cardiology;  Laterality: N/A;    INTRAVASCULAR ULTRASOUND, NON-CORONARY  7/23/2024     Procedure: Intravascular Ultrasound, Non-Coronary;  Surgeon: Solis Carr MD;  Location: [facility] CATH LAB;  Service: Cardiology;;    PTA, SUBCLAVIAN  7/23/2024    Procedure: PTA, Subclavian;  Surgeon: Solis Carr MD;  Location: [facility] CATH LAB;  Service: Cardiology;;    REVASCULARIZATION, ENDOVASCULAR, LE W/ INTRAVASCULAR LITHOTRIPSY AND STENT PLACEMENT Left 7/23/2024    Procedure: REVASCULARIZATION, ENDOVASCULAR, LE W/ INTRAVASCULAR LITHOTRIPSY AND STENT PLACEMENT;  Surgeon: Solis Carr MD;  Location: [facility] CATH LAB;  Service: Cardiology;  Laterality: Left;  L subclavian artery    RIGHT HEART CATHETERIZATION Right 7/23/2024    Procedure: INSERTION, CATHETER, RIGHT HEART;  Surgeon: Solis Carr MD;  Location: [facility] CATH LAB;  Service: Cardiology;  Laterality: Right;    STENT, SUBCLAVIAN Left 7/23/2024    Procedure: Stent, Subclavian;  Surgeon: Solis Carr MD;  Location: [facility] CATH LAB;  Service: Cardiology;  Laterality: Left;         SocHx:    Social History     Socioeconomic History    Marital status:    Tobacco Use    Smoking status: Never    Smokeless tobacco: Never   Substance and Sexual Activity    Alcohol use: No    Drug use: No    Sexual activity: Never     Social Determinants of Health     Financial Resource Strain: Low Risk  (3/22/2024)    [facility]    Overall Financial Resource Strain (CARDIA)     Difficulty of Paying Living Expenses: Not hard at all   Food Insecurity: Food Insecurity Present (4/18/2024)    [facility]    Hunger Vital Sign     Worried About Running Out of Food in the Last Year: Sometimes true     Ran Out of Food in the Last Year: Sometimes true   Transportation Needs: Unmet Transportation Needs (4/18/2024)    [facility]    PRAPARE - Transportation     Lack of Transportation (Medical): Yes     Lack of Transportation (Non-Medical): No         FamHx:    Family History   Problem Relation Name Age of Onset    Diabetes Father      Diabetes Sister      Diabetes Brother           ROS:    Per HPI.  Otherwise, 13 point ROS unremarkable except for what is listed in the HPI.     Allergies:    is allergic to adhesive tape-silicones.    Current medications:   Scheduled Meds:   furosemide (LASIX) injection  120 mg Intravenous ED 1 Time     Continuous Infusions:  PRN Meds:.       Physical Examination    VS/Measurements   /61   Pulse (!) 56   Temp 98.3 °F (36.8 °C) (Oral)   Resp 18   SpO2 96%     Gen: NAD, A&Ox3  HEENT: NCAT  Resp: CTA bilat, no w/r/r  CV: RRR, no r/g, Left UE AVF  Abd: +BS, S/NT/ND  Ext: Tr-1+ edema, warm LE bilat  Neuro: No focal deficits, moves all extremities  Skin: No rashes, warm, dry  Psyc: Appropriate mood and affect         Laboratory Results   Today's Lab Results :    Recent Results (from the past 24 hour(s))   EKG 12-lead    Collection Time: 08/20/24  1:27 PM   Result Value Ref Range    QRS Duration 102 ms    OHS QTC Calculation 461 ms   CBC auto differential    Collection Time: 08/20/24  1:32 PM   Result Value Ref Range    WBC 5.40 3.90 - 12.70 K/uL    RBC 3.31 (L) 4.60 - 6.20 M/uL    Hemoglobin 10.7 (L) 14.0 - 18.0 g/dL    Hematocrit 33.4 (L) 40.0 - 54.0 %     (H) 82 - 98 fL    MCH 32.3 (H) 27.0 - 31.0 pg    MCHC 32.0 32.0 - 36.0 g/dL    RDW 16.4 (H) 11.5 - 14.5 %    Platelets 274 150 - 450 K/uL    MPV 9.6 9.2 - 12.9 fL    Immature Granulocytes 0.2 0.0 - 0.5 %    Gran # (ANC) 3.3 1.8 - 7.7 K/uL    Immature Grans (Abs) 0.01 0.00 - 0.04 K/uL    Lymph # 1.0 1.0 - 4.8 K/uL    Mono # 0.6 0.3 - 1.0 K/uL    Eos # 0.4 0.0 - 0.5 K/uL    Baso # 0.06 0.00 - 0.20 K/uL    nRBC 0 0 /100 WBC    Gran % 61.7 38.0 - 73.0 %    Lymph % 19.3 18.0 - 48.0 %    Mono % 10.7 4.0 - 15.0 %    Eosinophil % 7.0 0.0 -  8.0 %    Basophil % 1.1 0.0 - 1.9 %    Differential Method Automated    Comprehensive metabolic panel    Collection Time: 08/20/24  1:32 PM   Result Value Ref Range    Sodium 138 136 - 145 mmol/L    Potassium 4.2 3.5 - 5.1 mmol/L    Chloride 98 95 - 110 mmol/L    CO2 23 23 - 29 mmol/L    Glucose 124 (H) 70 - 110 mg/dL    BUN 26 (H) 8 - 23 mg/dL    Creatinine 4.9 (H) 0.5 - 1.4 mg/dL    Calcium 10.3 8.7 - 10.5 mg/dL    Total Protein 7.6 6.0 - 8.4 g/dL    Albumin 3.5 3.5 - 5.2 g/dL    Total Bilirubin 0.7 0.1 - 1.0 mg/dL    Alkaline Phosphatase 109 55 - 135 U/L    AST 18 10 - 40 U/L    ALT 17 10 - 44 U/L    eGFR 12 (A) >60 mL/min/1.73 m^2    Anion Gap 17 (H) 8 - 16 mmol/L   Magnesium    Collection Time: 08/20/24  1:32 PM   Result Value Ref Range    Magnesium 2.3 1.6 - 2.6 mg/dL   Phosphorus    Collection Time: 08/20/24  1:32 PM   Result Value Ref Range    Phosphorus 3.3 2.7 - 4.5 mg/dL   Brain natriuretic peptide    Collection Time: 08/20/24  1:32 PM   Result Value Ref Range    BNP >4,900 (H) 0 - 99 pg/mL   Troponin I    Collection Time: 08/20/24  1:32 PM   Result Value Ref Range    Troponin I 0.079 (H) 0.000 - 0.026 ng/mL        Assessment and Plan   Impression:  Volume overload in the setting of severe ischemic cardiomyopathy with an EF of 15-20% on echo from July of this year.    Plan:   Volume overload.  Plan for serial dialysis today tomorrow and again on Thursday.  Hopefully back on routine dialysis after Thursday.  He may be able to go outpatient on Thursday to his [place] chair.    ESRD.  Currently MWF at [facility] while at [facility]  As above.      Dialysis access.  Left upper extremity AV fistula functional.    Ischemic cardiomyopathy.  EF of 15-20%.    History of cardiac arrest.  Has LifeVest.    Diabetes mellitus type 2 CKD.  Defer to Hospital Medicine.      Hypertension CKD.  Home medications.  UF with dialysis.      Anemia CKD.  No need for  JONAS at this time.  Last dose 50 mcg 08/07/2024.    CKD MBD/SHPT renal.  Phosphorus at goal.  Continue calcitriol and Sensipar.    I would like to thank Dr. Rizvi for this consultation and for allowing [facility] to participate in the care of Mr. Robles.      ________________________________________________  Marino Yap          Consults

## 2024-08-20 NOTE — SUBJECTIVE & OBJECTIVE
Past Medical History:   Diagnosis Date    Chronic kidney disease     Diabetes mellitus     Diabetes mellitus, type 2     Hyperlipidemia     Hypertension     Other specified anemias 07/24/2024    PVD (peripheral vascular disease) 08/15/2024       Past Surgical History:   Procedure Laterality Date    ANGIOGRAM, ABDOMINAL AORTA W/ EXTREMITY RUNOFF N/A 7/23/2024    Procedure: Angiogram, Abdominal Aorta W/ Extremity Runoff;  Surgeon: Solis Carr MD;  Location: [facility] CATH LAB;  Service: Cardiology;  Laterality: N/A;    ANGIOGRAM, CORONARY, WITH LEFT HEART CATHETERIZATION N/A 7/22/2024    Procedure: Angiogram, Coronary, with Left Heart Cath;  Surgeon: Solis Carr MD;  Location: [facility] CATH LAB;  Service: Cardiology;  Laterality: N/A;    ANGIOGRAM, LOWER ARTERIAL, BILATERAL  7/22/2024    Procedure: Angiogram, Lower Arterial, Bilateral;  Surgeon: Solis Carr MD;  Location: [facility] CATH LAB;  Service: Cardiology;;    ANGIOGRAM, LOWER ARTERIAL, UNILATERAL  7/23/2024    Procedure: Angiogram, Lower Arterial, Unilateral;  Surgeon: Solis Carr MD;  Location: [facility] CATH LAB;  Service: Cardiology;;    ARTERIOGRAPHY OF SUBCLAVIAN ARTERY  7/22/2024    Procedure: ARTERIOGRAM, SUBCLAVIAN;  Surgeon: Solis Carr MD;  Location: [facility] CATH LAB;  Service: Cardiology;;    ARTERIOGRAPHY OF SUBCLAVIAN ARTERY Left 7/23/2024    Procedure: ARTERIOGRAM, SUBCLAVIAN;  Surgeon: Solis Carr MD;  Location: [facility] CATH LAB;  Service: Cardiology;  Laterality: Left;    CORONARY BYPASS GRAFT ANGIOGRAPHY  7/22/2024    Procedure: Bypass graft study;  Surgeon: Solis Carr MD;  Location: [facility] CATH LAB;  Service: Cardiology;;    EYE SURGERY      INSERTION OF INTRAVASCULAR MICROAXIAL BLOOD PUMP N/A 7/22/2024    Procedure: INSERTION, IMPELLA;  Surgeon: Solis Carr MD;  Location: [facility] CATH LAB;  Service: Cardiology;  Laterality: N/A;    INTRAVASCULAR ULTRASOUND, NON-CORONARY  7/23/2024    Procedure: Intravascular Ultrasound,  Non-Coronary;  Surgeon: Solis Carr MD;  Location: [facility] CATH LAB;  Service: Cardiology;;    PTA, SUBCLAVIAN  7/23/2024    Procedure: PTA, Subclavian;  Surgeon: Solis Carr MD;  Location: [facility] CATH LAB;  Service: Cardiology;;    REVASCULARIZATION, ENDOVASCULAR, LE W/ INTRAVASCULAR LITHOTRIPSY AND STENT PLACEMENT Left 7/23/2024    Procedure: REVASCULARIZATION, ENDOVASCULAR, LE W/ INTRAVASCULAR LITHOTRIPSY AND STENT PLACEMENT;  Surgeon: Solis Carr MD;  Location: [facility] CATH LAB;  Service: Cardiology;  Laterality: Left;  L subclavian artery    RIGHT HEART CATHETERIZATION Right 7/23/2024    Procedure: INSERTION, CATHETER, RIGHT HEART;  Surgeon: Solis Carr MD;  Location: [facility] CATH LAB;  Service: Cardiology;  Laterality: Right;    STENT, SUBCLAVIAN Left 7/23/2024    Procedure: Stent, Subclavian;  Surgeon: Solis Carr MD;  Location: [facility] CATH LAB;  Service: Cardiology;  Laterality: Left;       Review of patient's allergies indicates:   Allergen Reactions    Adhesive tape-silicones Rash       No current facility-administered medications on file prior to encounter.     Current Outpatient Medications on File Prior to Encounter   Medication Sig    amiodarone (PACERONE) 200 MG Tab Take 2 tablets (400 mg total) by mouth once daily.    amLODIPine (NORVASC) 10 MG tablet Take 1 tablet (10 mg total) by mouth once daily. (Patient taking differently: Take 5 mg by mouth once daily.)    aspirin 81 MG Chew 1 tablet    atorvastatin (LIPITOR) 40 MG tablet TAKE 1 TABLET(40 MG) BY MOUTH EVERY DAY    clopidogreL (PLAVIX) 75 mg tablet Take 1 tablet (75 mg total) by mouth once daily.    ELIQUIS 2.5 mg Tab TAKE 1 TABLET BY MOUTH TWICE DAILY    lactulose (CEPHULAC) 20 gram Pack Take 20 g by mouth once daily.    metoprolol tartrate (LOPRESSOR) 25 MG tablet Take 0.5 tablets (12.5 mg total) by mouth 2 (two) times daily.    pantoprazole (PROTONIX) 40 MG tablet TAKE 1 TABLET(40 MG) BY MOUTH EVERY DAY    QUEtiapine (SEROQUEL) 25  MG Tab Take 25 mg by mouth every evening.    sacubitriL-valsartan (ENTRESTO) 24-26 mg per tablet Take 1 tablet by mouth 2 (two) times daily.    sevelamer carbonate (RENVELA) 800 mg Tab Take 1 tablet (800 mg total) by mouth 3 (three) times daily with meals.    [DISCONTINUED] cloNIDine (CATAPRES) 0.1 MG tablet Take 1 tablet (0.1 mg total) by mouth every 4 (four) hours as needed (160). (Patient not taking: Reported on 8/15/2024)     Family History       Problem Relation (Age of Onset)    Diabetes Father, Sister, Brother          Tobacco Use    Smoking status: Never    Smokeless tobacco: Never   Substance and Sexual Activity    Alcohol use: No    Drug use: No    Sexual activity: Never     Review of Systems   Constitutional: Positive for malaise/fatigue. Negative for decreased appetite, diaphoresis, fever and night sweats.   HENT:  Negative for nosebleeds.    Eyes:  Negative for blurred vision and double vision.   Cardiovascular:  Positive for dyspnea on exertion. Negative for chest pain, claudication, irregular heartbeat, leg swelling, near-syncope, orthopnea, palpitations, paroxysmal nocturnal dyspnea and syncope.   Respiratory:  Negative for cough, shortness of breath, sleep disturbances due to breathing, snoring, sputum production and wheezing.    Endocrine: Negative for cold intolerance and polyuria.   Hematologic/Lymphatic: Does not bruise/bleed easily.   Skin:  Negative for rash.   Musculoskeletal:  Negative for back pain, falls, joint pain, joint swelling and neck pain.   Gastrointestinal:  Negative for abdominal pain, heartburn, nausea and vomiting.   Genitourinary:  Negative for dysuria, frequency and hematuria.   Neurological:  Negative for difficulty with concentration, dizziness, focal weakness, headaches, light-headedness, numbness, seizures and weakness.   Psychiatric/Behavioral:  Negative for depression, memory loss and substance abuse. The patient does not have insomnia.    Allergic/Immunologic: Negative  "for HIV exposure and hives.     Objective:     Vital Signs (Most Recent):  Temp: 98.3 °F (36.8 °C) (08/20/24 1233)  Pulse: (!) 59 (08/20/24 1705)  Resp: (!) 23 (08/20/24 1705)  BP: (!) 128/55 (08/20/24 1705)  SpO2: 97 % (08/20/24 1705) Vital Signs (24h Range):  Temp:  [98.3 °F (36.8 °C)] 98.3 °F (36.8 °C)  Pulse:  [53-59] 59  Resp:  [18-23] 23  SpO2:  [96 %-97 %] 97 %  BP: (127-128)/(52-61) 128/55        There is no height or weight on file to calculate BMI.    SpO2: 97 %       No intake or output data in the 24 hours ending 08/20/24 1825    Lines/Drains/Airways       Peripheral Intravenous Line  Duration                  Hemodialysis AV Fistula Left upper arm -- days         Peripheral IV - Single Lumen 08/20/24 1628 20 G Anterior;Proximal;Right Forearm <1 day                     Physical Exam  HENT:      Head: Normocephalic.   Eyes:      Pupils: Pupils are equal, round, and reactive to light.   Neck:      Thyroid: No thyromegaly.      Vascular: Normal carotid pulses. No carotid bruit or JVD.   Cardiovascular:      Rate and Rhythm: Normal rate and regular rhythm. No extrasystoles are present.     Chest Wall: PMI is not displaced.      Pulses: Normal pulses.      Heart sounds: Normal heart sounds. No murmur heard.     No gallop. No S3 sounds.   Pulmonary:      Effort: No respiratory distress.      Breath sounds: Normal breath sounds. No stridor.   Abdominal:      General: Bowel sounds are normal.      Palpations: Abdomen is soft.      Tenderness: There is no abdominal tenderness. There is no rebound.   Skin:     Findings: No rash.   Neurological:      Mental Status: He is alert and oriented to person, place, and time.   Psychiatric:         Behavior: Behavior normal.          Significant Labs: ABG: No results for input(s): "PH", "PCO2", "HCO3", "POCSATURATED", "BE" in the last 48 hours., Blood Culture: No results for input(s): "LABBLOO" in the last 48 hours., BMP:   Recent Labs   Lab 08/20/24  1332   *   NA " "138   K 4.2   CL 98   CO2 23   BUN 26*   CREATININE 4.9*   CALCIUM 10.3   MG 2.3   , CMP   Recent Labs   Lab 08/20/24  1332      K 4.2   CL 98   CO2 23   *   BUN 26*   CREATININE 4.9*   CALCIUM 10.3   PROT 7.6   ALBUMIN 3.5   BILITOT 0.7   ALKPHOS 109   AST 18   ALT 17   ANIONGAP 17*   , CBC   Recent Labs   Lab 08/20/24  1332   WBC 5.40   HGB 10.7*   HCT 33.4*      , INR No results for input(s): "INR", "PROTIME" in the last 48 hours., Lipid Panel No results for input(s): "CHOL", "HDL", "LDLCALC", "TRIG", "CHOLHDL" in the last 48 hours., and Troponin   Recent Labs   Lab 08/20/24  1332   TROPONINI 0.079*       Significant Imaging: EKG:    "

## 2024-08-20 NOTE — ASSESSMENT & PLAN NOTE
Patient with Paroxysmal (<7 days) atrial fibrillation which is controlled currently with Beta Blocker and Amiodarone. Patient is currently in sinus rhythm.[id number] Score: 3. Anticoagulation indicated. Anticoagulation done with Eliquis .

## 2024-08-20 NOTE — SUBJECTIVE & OBJECTIVE
Past Medical History:   Diagnosis Date    Chronic kidney disease     Diabetes mellitus     Diabetes mellitus, type 2     Hyperlipidemia     Hypertension     Other specified anemias 07/24/2024    PVD (peripheral vascular disease) 08/15/2024       Past Surgical History:   Procedure Laterality Date    ANGIOGRAM, ABDOMINAL AORTA W/ EXTREMITY RUNOFF N/A 7/23/2024    Procedure: Angiogram, Abdominal Aorta W/ Extremity Runoff;  Surgeon: Solis Carr MD;  Location: [facility] CATH LAB;  Service: Cardiology;  Laterality: N/A;    ANGIOGRAM, CORONARY, WITH LEFT HEART CATHETERIZATION N/A 7/22/2024    Procedure: Angiogram, Coronary, with Left Heart Cath;  Surgeon: Solis Carr MD;  Location: [facility] CATH LAB;  Service: Cardiology;  Laterality: N/A;    ANGIOGRAM, LOWER ARTERIAL, BILATERAL  7/22/2024    Procedure: Angiogram, Lower Arterial, Bilateral;  Surgeon: Solis Carr MD;  Location: [facility] CATH LAB;  Service: Cardiology;;    ANGIOGRAM, LOWER ARTERIAL, UNILATERAL  7/23/2024    Procedure: Angiogram, Lower Arterial, Unilateral;  Surgeon: Solis Carr MD;  Location: [facility] CATH LAB;  Service: Cardiology;;    ARTERIOGRAPHY OF SUBCLAVIAN ARTERY  7/22/2024    Procedure: ARTERIOGRAM, SUBCLAVIAN;  Surgeon: Solis Carr MD;  Location: [facility] CATH LAB;  Service: Cardiology;;    ARTERIOGRAPHY OF SUBCLAVIAN ARTERY Left 7/23/2024    Procedure: ARTERIOGRAM, SUBCLAVIAN;  Surgeon: Solis Carr MD;  Location: [facility] CATH LAB;  Service: Cardiology;  Laterality: Left;    CORONARY BYPASS GRAFT ANGIOGRAPHY  7/22/2024    Procedure: Bypass graft study;  Surgeon: Solis Carr MD;  Location: [facility] CATH LAB;  Service: Cardiology;;    EYE SURGERY      INSERTION OF INTRAVASCULAR MICROAXIAL BLOOD PUMP N/A 7/22/2024    Procedure: INSERTION, IMPELLA;  Surgeon: Solis Carr MD;  Location: [facility] CATH LAB;  Service: Cardiology;  Laterality: N/A;    INTRAVASCULAR ULTRASOUND, NON-CORONARY  7/23/2024    Procedure: Intravascular Ultrasound,  Non-Coronary;  Surgeon: Solis Carr MD;  Location: [facility] CATH LAB;  Service: Cardiology;;    PTA, SUBCLAVIAN  7/23/2024    Procedure: PTA, Subclavian;  Surgeon: Solis Carr MD;  Location: [facility] CATH LAB;  Service: Cardiology;;    REVASCULARIZATION, ENDOVASCULAR, LE W/ INTRAVASCULAR LITHOTRIPSY AND STENT PLACEMENT Left 7/23/2024    Procedure: REVASCULARIZATION, ENDOVASCULAR, LE W/ INTRAVASCULAR LITHOTRIPSY AND STENT PLACEMENT;  Surgeon: Solis Carr MD;  Location: [facility] CATH LAB;  Service: Cardiology;  Laterality: Left;  L subclavian artery    RIGHT HEART CATHETERIZATION Right 7/23/2024    Procedure: INSERTION, CATHETER, RIGHT HEART;  Surgeon: Solis Carr MD;  Location: [facility] CATH LAB;  Service: Cardiology;  Laterality: Right;    STENT, SUBCLAVIAN Left 7/23/2024    Procedure: Stent, Subclavian;  Surgeon: Solis Carr MD;  Location: [facility] CATH LAB;  Service: Cardiology;  Laterality: Left;       Review of patient's allergies indicates:   Allergen Reactions    Adhesive tape-silicones Rash       No current facility-administered medications on file prior to encounter.     Current Outpatient Medications on File Prior to Encounter   Medication Sig    amiodarone (PACERONE) 200 MG Tab Take 2 tablets (400 mg total) by mouth once daily.    amLODIPine (NORVASC) 10 MG tablet Take 1 tablet (10 mg total) by mouth once daily.    aspirin 81 MG Chew 1 tablet    atorvastatin (LIPITOR) 40 MG tablet TAKE 1 TABLET(40 MG) BY MOUTH EVERY DAY    cloNIDine (CATAPRES) 0.1 MG tablet Take 1 tablet (0.1 mg total) by mouth every 4 (four) hours as needed (160). (Patient not taking: Reported on 8/15/2024)    clopidogreL (PLAVIX) 75 mg tablet Take 1 tablet (75 mg total) by mouth once daily.    ELIQUIS 2.5 mg Tab TAKE 1 TABLET BY MOUTH TWICE DAILY    metoprolol tartrate (LOPRESSOR) 25 MG tablet Take 0.5 tablets (12.5 mg total) by mouth 2 (two) times daily.    pantoprazole (PROTONIX) 40 MG tablet TAKE 1 TABLET(40 MG) BY MOUTH EVERY  DAY (Patient not taking: Reported on 8/15/2024)    sevelamer carbonate (RENVELA) 800 mg Tab Take 1 tablet (800 mg total) by mouth 3 (three) times daily with meals.     Family History       Problem Relation (Age of Onset)    Diabetes Father, Sister, Brother          Tobacco Use    Smoking status: Never    Smokeless tobacco: Never   Substance and Sexual Activity    Alcohol use: No    Drug use: No    Sexual activity: Never     Review of Systems   Constitutional:  Positive for activity change and fatigue. Negative for appetite change, chills, diaphoresis and fever.   HENT:  Negative for congestion, nosebleeds, sore throat and trouble swallowing.    Eyes:  Negative for pain, discharge and visual disturbance.   Respiratory:  Positive for shortness of breath. Negative for apnea, cough, chest tightness, wheezing and stridor.    Cardiovascular:  Positive for leg swelling. Negative for chest pain and palpitations.   Gastrointestinal:  Positive for diarrhea (onset today after given Lactulose). Negative for abdominal distention, abdominal pain, blood in stool, constipation, nausea and vomiting.   Endocrine: Negative for cold intolerance and heat intolerance.   Genitourinary:  Negative for difficulty urinating, dysuria, flank pain, frequency and urgency.   Musculoskeletal:  Negative for arthralgias, back pain, joint swelling, myalgias, neck pain and neck stiffness.   Skin:  Negative for rash and wound.   Allergic/Immunologic: Negative for food allergies and immunocompromised state.   Neurological:  Positive for weakness. Negative for dizziness, seizures, syncope, facial asymmetry, light-headedness and headaches.   Hematological:  Negative for adenopathy.   Psychiatric/Behavioral:  Negative for agitation, behavioral problems and confusion. The patient is not nervous/anxious.      Objective:     Vital Signs (Most Recent):  Temp: 98.3 °F (36.8 °C) (08/20/24 1233)  Pulse: (!) 59 (08/20/24 1705)  Resp: (!) 23 (08/20/24 1705)  BP: (!)  128/55 (08/20/24 1705)  SpO2: 97 % (08/20/24 1705) Vital Signs (24h Range):  Temp:  [98.3 °F (36.8 °C)] 98.3 °F (36.8 °C)  Pulse:  [53-59] 59  Resp:  [18-23] 23  SpO2:  [96 %-97 %] 97 %  BP: (127-128)/(52-61) 128/55        There is no height or weight on file to calculate BMI.     Physical Exam  Vitals and nursing note reviewed.   Constitutional:       General: He is not in acute distress.     Appearance: He is well-developed. He is not diaphoretic.   HENT:      Head: Normocephalic and atraumatic.      Nose: Nose normal.   Eyes:      General: No scleral icterus.     Conjunctiva/sclera: Conjunctivae normal.   Cardiovascular:      Rate and Rhythm: Regular rhythm. Bradycardia present.      Heart sounds: Normal heart sounds. No murmur heard.     No friction rub. No gallop.      Comments: Rales, diminished to bases  Pulmonary:      Effort: Tachypnea and accessory muscle usage present. No respiratory distress.      Breath sounds: Normal breath sounds. No stridor. No wheezing or rales.      Comments: +conversational dyspnea   Chest:      Chest wall: No tenderness.   Abdominal:      General: Bowel sounds are normal. There is no distension.      Palpations: Abdomen is soft.      Tenderness: There is no abdominal tenderness. There is no guarding or rebound.   Musculoskeletal:         General: No tenderness or deformity. Normal range of motion.      Cervical back: Normal range of motion and neck supple.      Right lower leg: Edema (trace) present.      Left lower leg: Edema (trace) present.   Skin:     General: Skin is warm and dry.      Coloration: Skin is not pale.      Findings: No erythema or rash.   Neurological:      Mental Status: He is alert and oriented to person, place, and time.      Cranial Nerves: No cranial nerve deficit.      Motor: No abnormal muscle tone.      Coordination: Coordination normal.   Psychiatric:         Behavior: Behavior normal.         Thought Content: Thought content normal.                 Significant Labs: All pertinent labs within the past 24 hours have been reviewed.    Significant Imaging: I have reviewed all pertinent imaging results/findings within the past 24 hours.

## 2024-08-20 NOTE — HPI
The patient is a 63 yo male with DM, HTN, CAD s/p CABG, PAF-on Eliquis, ESRD on HD MWF, Vtach/NSTEMI Cardiac arrest x2 7/2024, CHF with EF 15-20%, Life vest who presented to ED with fatigue and SOB onset one week ago that gradually worsened. Pt reports right sided fracture/broken ribs from previous CPR. Pt reports compliance with HD.     In the ED, Vital signs and oxygenation stable, Mild bradycardia, labs revealed BNP >4900, trop 0.079 (down trending from last admit 7/2024), CXR- bilateral pleural effusions. EKG- sinus bradycardia, 1st degree AV block, TWI in lateral leads.

## 2024-08-20 NOTE — ASSESSMENT & PLAN NOTE
Serial troponin   Appears to be down trending from last admit for NSTEMI  Denies CP  Cont ASA, BB, Plavix, Statin, Entresto

## 2024-08-20 NOTE — ASSESSMENT & PLAN NOTE
Patient is identified as having Combined Systolic and Diastolic heart failure that is Acute on chronic. CHF is currently uncontrolled due to Rales/crackles on pulmonary exam and Pulmonary edema/pleural effusion on CXR. Latest ECHO performed and demonstrates- Results for orders placed during the hospital encounter of 07/16/24    Echo    Interpretation Summary    Left Ventricle: Regional wall motion abnormalities present. Septal motion is consistent with post-operative status. There is severely reduced systolic function with a visually estimated ejection fraction of 15 - 20%. Ejection fraction by visual approximation is 20%.    There is an impella that is adequately placed.  . Continue Beta Blocker and ARNI and monitor clinical status closely. Monitor on telemetry. Patient is on CHF pathway.  Monitor strict Is&Os and daily weights.  Place on fluid restriction of 1.5 L. Cardiology has been consulted. Continue to stress to patient importance of self efficacy and  on diet for CHF. Last BNP reviewed- and noted below   Recent Labs   Lab 08/20/24  1332   BNP >4,900*     Nephrology consulted who plans for HD T, W, and Th for UF

## 2024-08-21 ENCOUNTER — DOCUMENTATION ONLY (OUTPATIENT)
Dept: CARDIOLOGY | Facility: CLINIC | Age: 64
End: 2024-08-21
Payer: MEDICARE

## 2024-08-21 LAB
ALBUMIN SERPL BCP-MCNC: 2.6 G/DL (ref 3.5–5.2)
ANION GAP SERPL CALC-SCNC: 7 MMOL/L (ref 8–16)
BASOPHILS # BLD AUTO: 0.04 K/UL (ref 0–0.2)
BASOPHILS NFR BLD: 0.8 % (ref 0–1.9)
BUN SERPL-MCNC: 11 MG/DL (ref 8–23)
CALCIUM SERPL-MCNC: 9.1 MG/DL (ref 8.7–10.5)
CHLORIDE SERPL-SCNC: 106 MMOL/L (ref 95–110)
CO2 SERPL-SCNC: 22 MMOL/L (ref 23–29)
CREAT SERPL-MCNC: 3 MG/DL (ref 0.5–1.4)
DIFFERENTIAL METHOD BLD: ABNORMAL
EOSINOPHIL # BLD AUTO: 0.3 K/UL (ref 0–0.5)
EOSINOPHIL NFR BLD: 6.1 % (ref 0–8)
ERYTHROCYTE [DISTWIDTH] IN BLOOD BY AUTOMATED COUNT: 15.9 % (ref 11.5–14.5)
EST. GFR  (NO RACE VARIABLE): 22 ML/MIN/1.73 M^2
GLUCOSE SERPL-MCNC: 89 MG/DL (ref 70–110)
HCT VFR BLD AUTO: 27.9 % (ref 40–54)
HGB BLD-MCNC: 8.9 G/DL (ref 14–18)
IMM GRANULOCYTES # BLD AUTO: 0.02 K/UL (ref 0–0.04)
IMM GRANULOCYTES NFR BLD AUTO: 0.4 % (ref 0–0.5)
LYMPHOCYTES # BLD AUTO: 0.7 K/UL (ref 1–4.8)
LYMPHOCYTES NFR BLD: 14.7 % (ref 18–48)
MAGNESIUM SERPL-MCNC: 2 MG/DL (ref 1.6–2.6)
MCH RBC QN AUTO: 32 PG (ref 27–31)
MCHC RBC AUTO-ENTMCNC: 31.9 G/DL (ref 32–36)
MCV RBC AUTO: 100 FL (ref 82–98)
MONOCYTES # BLD AUTO: 0.5 K/UL (ref 0.3–1)
MONOCYTES NFR BLD: 10.6 % (ref 4–15)
NEUTROPHILS # BLD AUTO: 3.3 K/UL (ref 1.8–7.7)
NEUTROPHILS NFR BLD: 67.4 % (ref 38–73)
NRBC BLD-RTO: 0 /100 WBC
PHOSPHATE SERPL-MCNC: 2.7 MG/DL (ref 2.7–4.5)
PLATELET # BLD AUTO: 223 K/UL (ref 150–450)
PMV BLD AUTO: 9.8 FL (ref 9.2–12.9)
POCT GLUCOSE: 139 MG/DL (ref 70–110)
POCT GLUCOSE: 71 MG/DL (ref 70–110)
POTASSIUM SERPL-SCNC: 4 MMOL/L (ref 3.5–5.1)
RBC # BLD AUTO: 2.78 M/UL (ref 4.6–6.2)
SODIUM SERPL-SCNC: 135 MMOL/L (ref 136–145)
WBC # BLD AUTO: 4.89 K/UL (ref 3.9–12.7)

## 2024-08-21 PROCEDURE — 99232 SBSQ HOSP IP/OBS MODERATE 35: CPT | Mod: ,,, | Performed by: INTERNAL MEDICINE

## 2024-08-21 PROCEDURE — 80069 RENAL FUNCTION PANEL: CPT | Performed by: INTERNAL MEDICINE

## 2024-08-21 PROCEDURE — 90935 HEMODIALYSIS ONE EVALUATION: CPT

## 2024-08-21 PROCEDURE — 83735 ASSAY OF MAGNESIUM: CPT | Performed by: INTERNAL MEDICINE

## 2024-08-21 PROCEDURE — 21400001 HC TELEMETRY ROOM

## 2024-08-21 PROCEDURE — 85025 COMPLETE CBC W/AUTO DIFF WBC: CPT | Performed by: INTERNAL MEDICINE

## 2024-08-21 PROCEDURE — 25000003 PHARM REV CODE 250: Performed by: NURSE PRACTITIONER

## 2024-08-21 PROCEDURE — 36415 COLL VENOUS BLD VENIPUNCTURE: CPT | Performed by: INTERNAL MEDICINE

## 2024-08-21 PROCEDURE — 99900035 HC TECH TIME PER 15 MIN (STAT)

## 2024-08-21 RX ADMIN — METOPROLOL TARTRATE 12.5 MG: 25 TABLET, FILM COATED ORAL at 08:08

## 2024-08-21 RX ADMIN — SACUBITRIL AND VALSARTAN 1 TABLET: 24; 26 TABLET, FILM COATED ORAL at 08:08

## 2024-08-21 RX ADMIN — SEVELAMER CARBONATE 800 MG: 800 TABLET, FILM COATED ORAL at 06:08

## 2024-08-21 RX ADMIN — QUETIAPINE FUMARATE 25 MG: 25 TABLET ORAL at 08:08

## 2024-08-21 RX ADMIN — APIXABAN 2.5 MG: 2.5 TABLET, FILM COATED ORAL at 08:08

## 2024-08-21 NOTE — PROGRESS NOTES
[facility] ([facility]) Team notified of pt referral via [facility] (automated inbasket notification) .    Patient screened today by provider and HF nurse for enrollment to program.      Pt was deemed not a candidate for enrollment at this time related to patient is currently on hemo-dialysis.    Pt will require additional follow up planning per primary team.     If pt status, diagnosis, or treatment plan changes , please place AMB referral to Heart Failure Clinic ([id number]).

## 2024-08-21 NOTE — ASSESSMENT & PLAN NOTE
Patient is identified as having Systolic (HFrEF) heart failure that is Acute on chronic. CHF is currently uncontrolled due to Continued edema of extremities. Latest ECHO performed and demonstrates- Results for orders placed during the hospital encounter of 07/16/24    Echo    Interpretation Summary    Left Ventricle: Regional wall motion abnormalities present. Septal motion is consistent with post-operative status. There is severely reduced systolic function with a visually estimated ejection fraction of 15 - 20%. Ejection fraction by visual approximation is 20%.    There is an impella that is adequately placed.  . Continue Beta Blocker, Furosemide, and ARNI and monitor clinical status closely. Monitor on telemetry. Patient is on CHF pathway.  Monitor strict Is&Os and daily weights.  Place on fluid restriction of 1.5 L. Cardiology has been consulted. Continue to stress to patient importance of self efficacy and  on diet for CHF. Last BNP reviewed- and noted below   Recent Labs   Lab 08/20/24  1332   BNP >4,900*         CHFrEF fluid overloaded  Continue toprolXL entrestor  HD per nephrology  Fluid restriction 34 oz  Continue amio 400 mg daily and Lifevest due to recent h/o aborted cardiac arrest    8/21/24  -Improving, seen during HD today  -Continue OMT

## 2024-08-21 NOTE — PROGRESS NOTES
[facility]  Progress Note    Patient Name: Silvino Robles  MRN: [id number]  Patient Class: IP- Inpatient   Admission Date: 8/20/2024  Length of Stay: 1 days  Attending Physician: Richar Aguero MD  Primary Care Provider: Cristiana, Primary Doctor        Subjective:     Principal Problem:Acute on chronic combined systolic and diastolic congestive heart failure        HPI:  The patient is a 65 yo male with DM, HTN, CAD s/p CABG, PAF-on Eliquis, ESRD on HD MWF, Vtach/NSTEMI Cardiac arrest x2 7/2024, CHF with EF 15-20%, Life vest who presented to ED with fatigue and SOB onset one week ago that gradually worsened. Pt reports right sided fracture/broken ribs from previous CPR. Pt reports compliance with HD.     In the ED, Vital signs and oxygenation stable, Mild bradycardia, labs revealed BNP >4900, trop 0.079 (down trending from last admit 7/2024), CXR- bilateral pleural effusions. EKG- sinus bradycardia, 1st degree AV block, TWI in lateral leads.     Overview/Hospital Course:  The patient is a 65 yo male with DM, HTN, CAD s/p CABG, PAF-on Eliquis, ESRD on HD MWF, Vtach/NSTEMI Cardiac arrest x2 7/2024, CHF with EF 15-20%, Life vest who was admitted for fluid overload/decompensated CHF. Cardiology and nephrology both consulted.   Nephrology recommended HD x 2-3 consecutive days for UF.     Interval History: pt feels much improved. States he is able to lie flat. No SOB at rest. +TALLEY     Review of Systems   Constitutional:  Positive for activity change and fatigue. Negative for appetite change, chills, diaphoresis and fever.   HENT:  Negative for congestion, nosebleeds, sore throat and trouble swallowing.    Eyes:  Negative for pain, discharge and visual disturbance.   Respiratory:  Positive for shortness of breath. Negative for apnea, cough, chest tightness, wheezing and stridor.    Cardiovascular:  Positive for leg swelling. Negative for chest pain and palpitations.   Gastrointestinal:  Positive for  diarrhea (onset today after given Lactulose). Negative for abdominal distention, abdominal pain, blood in stool, constipation, nausea and vomiting.   Endocrine: Negative for cold intolerance and heat intolerance.   Genitourinary:  Negative for difficulty urinating, dysuria, flank pain, frequency and urgency.   Musculoskeletal:  Negative for arthralgias, back pain, joint swelling, myalgias, neck pain and neck stiffness.   Skin:  Negative for rash and wound.   Allergic/Immunologic: Negative for food allergies and immunocompromised state.   Neurological:  Positive for weakness. Negative for dizziness, seizures, syncope, facial asymmetry, light-headedness and headaches.   Hematological:  Negative for adenopathy.   Psychiatric/Behavioral:  Negative for agitation, behavioral problems and confusion. The patient is not nervous/anxious.      Objective:     Vital Signs (Most Recent):  Temp: 98.4 °F (36.9 °C) (08/21/24 0729)  Pulse: 62 (08/21/24 0928)  Resp: 18 (08/21/24 0729)  BP: (!) 95/52 (08/21/24 0928)  SpO2: 98 % (08/21/24 0928) Vital Signs (24h Range):  Temp:  [97.9 °F (36.6 °C)-98.4 °F (36.9 °C)] 98.4 °F (36.9 °C)  Pulse:  [53-70] 62  Resp:  [16-23] 18  SpO2:  [95 %-99 %] 98 %  BP: ()/(49-63) 95/52     Weight: 78.1 kg (172 lb 2.9 oz)  Body mass index is 26.97 kg/m².     Physical Exam  Vitals and nursing note reviewed.   Constitutional:       General: He is not in acute distress.     Appearance: He is well-developed. He is not diaphoretic.   HENT:      Head: Normocephalic and atraumatic.      Nose: Nose normal.   Eyes:      General: No scleral icterus.     Conjunctiva/sclera: Conjunctivae normal.   Cardiovascular:      Rate and Rhythm: Regular rhythm. Bradycardia present.      Heart sounds: Normal heart sounds. No murmur heard.     No friction rub. No gallop.      Comments: Rales, diminished to bases  Pulmonary:      Effort: No tachypnea, accessory muscle usage or respiratory distress.      Breath sounds: No  stridor. Rales present.   Chest:      Chest wall: No tenderness.   Abdominal:      General: Bowel sounds are normal. There is no distension.      Palpations: Abdomen is soft.      Tenderness: There is no abdominal tenderness. There is no guarding or rebound.   Musculoskeletal:         General: No tenderness or deformity. Normal range of motion.      Cervical back: Normal range of motion and neck supple.      Right lower leg: Edema (trace) present.      Left lower leg: Edema (trace) present.   Skin:     General: Skin is warm and dry.      Coloration: Skin is not pale.      Findings: No erythema or rash.   Neurological:      Mental Status: He is alert and oriented to person, place, and time.      Cranial Nerves: No cranial nerve deficit.      Motor: No abnormal muscle tone.      Coordination: Coordination normal.   Psychiatric:         Behavior: Behavior normal.         Thought Content: Thought content normal.                Significant Labs: All pertinent labs within the past 24 hours have been reviewed.    Significant Imaging: I have reviewed all pertinent imaging results/findings within the past 24 hours.    Assessment/Plan:      * Acute on chronic combined systolic and diastolic congestive heart failure  Patient is identified as having Combined Systolic and Diastolic heart failure that is Acute on chronic. CHF is currently uncontrolled due to Rales/crackles on pulmonary exam and Pulmonary edema/pleural effusion on CXR. Latest ECHO performed and demonstrates- Results for orders placed during the hospital encounter of 07/16/24    Echo    Interpretation Summary    Left Ventricle: Regional wall motion abnormalities present. Septal motion is consistent with post-operative status. There is severely reduced systolic function with a visually estimated ejection fraction of 15 - 20%. Ejection fraction by visual approximation is 20%.    There is an impella that is adequately placed.  . Continue Beta Blocker and ARNI and  "monitor clinical status closely. Monitor on telemetry. Patient is on CHF pathway.  Monitor strict Is&Os and daily weights.  Place on fluid restriction of 1.5 L. Cardiology has been consulted. Continue to stress to patient importance of self efficacy and  on diet for CHF. Last BNP reviewed- and noted below   Recent Labs   Lab 08/20/24  1332   BNP >4,900*     Nephrology consulted who plans for HD T, W, and Th for UF    Troponin level elevated  Appears to be down trending from last admit for NSTEMI  Denies CP  Cont ASA, BB, Plavix, Statin, Entresto      Paroxysmal atrial fibrillation  Patient with Paroxysmal (<7 days) atrial fibrillation which is controlled currently with Beta Blocker and Amiodarone. Patient is currently in sinus rhythm.[id number] Score: 3. Anticoagulation indicated. Anticoagulation done with Eliquis .    Coronary artery disease involving coronary bypass graft of native heart  Patient with known CAD s/p CABG, which is controlled Will continue  BB, ASA, Plavix, and Statin and monitor for S/Sx of angina/ACS. Continue to monitor on telemetry.     ESRD needing dialysis  Creatine stable for now. BMP reviewed- noted CrCl cannot be calculated (Unknown ideal weight.). according to latest data. Based on current GFR, CKD stage is end stage.  Monitor UOP and serial BMP and adjust therapy as needed. Renally dose meds. Avoid nephrotoxic medications and procedures.    Nephrology consulted and recommends HD for next 3 days for UF    Type 2 diabetes mellitus with neurologic complication, without long-term current use of insulin  Patient's FSGs are controlled on current medication regimen.  Last A1c reviewed-   Lab Results   Component Value Date    HGBA1C 5.6 06/19/2024     Most recent fingerstick glucose reviewed- No results for input(s): "POCTGLUCOSE" in the last 24 hours.  Current correctional scale  Low  Maintain anti-hyperglycemic dose as follows-   Antihyperglycemics (From admission, onward)      Start     " Stop Route Frequency Ordered    08/20/24 1755  insulin aspart U-100 pen 0-5 Units         -- SubQ Before meals & nightly PRN 08/20/24 1656          Hold Oral hypoglycemics while patient is in the hospital.       Primary hypertension  Chronic, controlled. Latest blood pressure and vitals reviewed-     Temp:  [98.3 °F (36.8 °C)]   Pulse:  [53-59]   Resp:  [18-23]   BP: (127-128)/(52-61)   SpO2:  [96 %-97 %] .   Home meds for hypertension were reviewed and noted below.   Hypertension Medications               amLODIPine (NORVASC) 10 MG tablet Take 1 tablet (10 mg total) by mouth once daily.    metoprolol tartrate (LOPRESSOR) 25 MG tablet Take 0.5 tablets (12.5 mg total) by mouth 2 (two) times daily.    sacubitriL-valsartan (ENTRESTO) 24-26 mg per tablet Take 1 tablet by mouth 2 (two) times daily.            While in the hospital, will manage blood pressure as follows; Continue home antihypertensive regimen    Will utilize p.r.n. blood pressure medication only if patient's blood pressure greater than 160/100 and he develops symptoms such as worsening chest pain or shortness of breath.    Hold Amlodipine   Cont Lopressor and Entresto       VTE Risk Mitigation (From admission, onward)           Ordered     apixaban tablet 2.5 mg  2 times daily         08/20/24 1654     IP VTE HIGH RISK PATIENT  Once         08/20/24 1654                    Discharge Planning   JEREMIAH:      Code Status: Full Code   Is the patient medically ready for discharge?:     Reason for patient still in hospital (select all that apply): Patient trending condition  Discharge Plan A: Home Health                  Phyllis Badillo NP  Department of Hospital Medicine   [facility] - Med Surg

## 2024-08-21 NOTE — PLAN OF CARE
Problem: Adult Inpatient Plan of Care  Goal: Plan of Care Review  Outcome: Progressing  Goal: Patient-Specific Goal (Individualized)  Outcome: Progressing  Goal: Absence of Hospital-Acquired Illness or Injury  Outcome: Progressing  Goal: Optimal Comfort and Wellbeing  Outcome: Progressing  Goal: Readiness for Transition of Care  Outcome: Progressing     Problem: Hemodialysis  Goal: Safe, Effective Therapy Delivery  Outcome: Progressing  Goal: Effective Tissue Perfusion  Outcome: Progressing  Goal: Absence of Infection Signs and Symptoms  Outcome: Progressing     Problem: Diabetes Comorbidity  Goal: Blood Glucose Level Within Targeted Range  Outcome: Progressing     Problem: Acute Kidney Injury/Impairment  Goal: Fluid and Electrolyte Balance  Outcome: Progressing  Goal: Improved Oral Intake  Outcome: Progressing  Goal: Effective Renal Function  Outcome: Progressing      Discussed POC with pt and family, verbalized understanding.  Patient remains free from injury.  Safety precautions maintained.   Call light and personal belongings within reach, bed in lowest position with bed wheels locked.   No s/s of acute distress.  Purposeful rounding continued this shift.  Pain controlled per MD order.  Cardiac monitoring in place, tele box number [id number]. Reading sinus naomi  Blood glucose monitoring continued this shift.   Diet orders continued, pt diet: diabetic, renal, low sodium, 1500 mL fluid restriction  Vital signs continued per orders this shift.   Patient is Turkish speaking, prefers family to translate.  services offered  Chart and orders review completed. Pt education about care completed.

## 2024-08-21 NOTE — ASSESSMENT & PLAN NOTE
Appears to be down trending from last admit for NSTEMI  Denies CP  Cont ASA, BB, Plavix, Statin, Entresto

## 2024-08-21 NOTE — PROGRESS NOTES
08/20/24 4000   Post-Hemodialysis Assessment   Blood Volume Processed (Liters) 84 L   Dialyzer Clearance Moderately streaked   Duration of Treatment 240 minutes   Total UF (mL) 2500 mL   Patient Response to Treatment Pt tolerated tx well

## 2024-08-21 NOTE — PLAN OF CARE
Problem: Hemodialysis  Goal: Safe, Effective Therapy Delivery  Intervention: Optimize Device Care and Function  Flowsheets (Taken 8/21/2024 1100)  Circuit Management:   air detection alarms on   circuit line warming device in use   tubing repositioned   tubing/circuit/filter adjusted  Medication Review/Management:   medications reviewed   high-risk medications identified   provider consulted     4 hour I-HDtx initiated and in progress as ordered for Dr Yap ; planned NET UF = up to 2.5L as tolerated , pt is noted with a Left UE AVF easily cannulated with 15ga dialysis Angios x 2, good blood return and easily flushed. All lines connected and secured. Will continue to monitor for changes and trends.  Regular Dialysis schedule is MWF.

## 2024-08-21 NOTE — SUBJECTIVE & OBJECTIVE
Interval History: pt feels much improved. States he is able to lie flat. No SOB at rest. +TALLEY     Review of Systems   Constitutional:  Positive for activity change and fatigue. Negative for appetite change, chills, diaphoresis and fever.   HENT:  Negative for congestion, nosebleeds, sore throat and trouble swallowing.    Eyes:  Negative for pain, discharge and visual disturbance.   Respiratory:  Positive for shortness of breath. Negative for apnea, cough, chest tightness, wheezing and stridor.    Cardiovascular:  Positive for leg swelling. Negative for chest pain and palpitations.   Gastrointestinal:  Positive for diarrhea (onset today after given Lactulose). Negative for abdominal distention, abdominal pain, blood in stool, constipation, nausea and vomiting.   Endocrine: Negative for cold intolerance and heat intolerance.   Genitourinary:  Negative for difficulty urinating, dysuria, flank pain, frequency and urgency.   Musculoskeletal:  Negative for arthralgias, back pain, joint swelling, myalgias, neck pain and neck stiffness.   Skin:  Negative for rash and wound.   Allergic/Immunologic: Negative for food allergies and immunocompromised state.   Neurological:  Positive for weakness. Negative for dizziness, seizures, syncope, facial asymmetry, light-headedness and headaches.   Hematological:  Negative for adenopathy.   Psychiatric/Behavioral:  Negative for agitation, behavioral problems and confusion. The patient is not nervous/anxious.      Objective:     Vital Signs (Most Recent):  Temp: 98.4 °F (36.9 °C) (08/21/24 0729)  Pulse: 62 (08/21/24 0928)  Resp: 18 (08/21/24 0729)  BP: (!) 95/52 (08/21/24 0928)  SpO2: 98 % (08/21/24 0928) Vital Signs (24h Range):  Temp:  [97.9 °F (36.6 °C)-98.4 °F (36.9 °C)] 98.4 °F (36.9 °C)  Pulse:  [53-70] 62  Resp:  [16-23] 18  SpO2:  [95 %-99 %] 98 %  BP: ()/(49-63) 95/52     Weight: 78.1 kg (172 lb 2.9 oz)  Body mass index is 26.97 kg/m².     Physical Exam  Vitals and nursing  note reviewed.   Constitutional:       General: He is not in acute distress.     Appearance: He is well-developed. He is not diaphoretic.   HENT:      Head: Normocephalic and atraumatic.      Nose: Nose normal.   Eyes:      General: No scleral icterus.     Conjunctiva/sclera: Conjunctivae normal.   Cardiovascular:      Rate and Rhythm: Regular rhythm. Bradycardia present.      Heart sounds: Normal heart sounds. No murmur heard.     No friction rub. No gallop.      Comments: Rales, diminished to bases  Pulmonary:      Effort: No tachypnea, accessory muscle usage or respiratory distress.      Breath sounds: No stridor. Rales present.   Chest:      Chest wall: No tenderness.   Abdominal:      General: Bowel sounds are normal. There is no distension.      Palpations: Abdomen is soft.      Tenderness: There is no abdominal tenderness. There is no guarding or rebound.   Musculoskeletal:         General: No tenderness or deformity. Normal range of motion.      Cervical back: Normal range of motion and neck supple.      Right lower leg: Edema (trace) present.      Left lower leg: Edema (trace) present.   Skin:     General: Skin is warm and dry.      Coloration: Skin is not pale.      Findings: No erythema or rash.   Neurological:      Mental Status: He is alert and oriented to person, place, and time.      Cranial Nerves: No cranial nerve deficit.      Motor: No abnormal muscle tone.      Coordination: Coordination normal.   Psychiatric:         Behavior: Behavior normal.         Thought Content: Thought content normal.                Significant Labs: All pertinent labs within the past 24 hours have been reviewed.    Significant Imaging: I have reviewed all pertinent imaging results/findings within the past 24 hours.

## 2024-08-21 NOTE — PLAN OF CARE
Inpatient Upgrade Note    Silvino Robles has warranted treatment spanning two or more midnights of hospital level care for the management of heart failure and Elevated Troponin . He continues to require daily labs, further testing/imaging, monitoring of vital signs, medication adjustments, further evaluation by consultants, and fluid restriction . His condition is also complicated by the following comorbidities: Hypertension, Diabetes, and Chronic kidney disease.

## 2024-08-21 NOTE — HOSPITAL COURSE
8/21/24-Patient seen and examined today during HD. Feels ok. No AEON. Labs/CXR reviewed.     8/22/24-Patient seen and examined today, resting on bedside couch. No CV complaints. SOB much improved. Nephrology following. Labs reviewed.

## 2024-08-21 NOTE — HOSPITAL COURSE
The patient is a 65 yo male with DM, HTN, CAD s/p CABG, PAF-on Eliquis, ESRD on HD MWF, Vtach/NSTEMI Cardiac arrest x2 7/2024, CHF with EF 15-20%, Life vest who was admitted for fluid overload/decompensated CHF. Cardiology and nephrology both consulted.   Nephrology recommended HD x 2 consecutive days for UF. SOB resolved. Nephrology discussed with HD clinic to extend HD to 4 hours and restrict oral fluids to 500-1000ml and daily weights. If pt continues to have volume control issues, he will be placed on HD 4 times weekly. Pt counseled on Nephrology recommendations and to keep a log of weights and bring to HD clinic. Home health arranged. The patient was seen and examined today and determined to be stable for discharge.

## 2024-08-21 NOTE — PLAN OF CARE
[facility] - Med Surg  Initial Discharge Assessment       Primary Care Provider: No, Primary Doctor    Admission Diagnosis: SOB (shortness of breath) [R06.02]  Acute on chronic combined systolic and diastolic congestive heart failure [I50.43]  ESRD needing dialysis [N18.6, Z99.2]  Hypervolemia, unspecified hypervolemia type [E87.70]    Admission Date: 8/20/2024  Expected Discharge Date: Per Attending    Transition of Care Barriers: None    Payor: [employer] MANAGED MEDICARE / Plan: [employer] MEDICARE DUAL DSNP / Product Type: Medicare Advantage /     Extended Emergency Contact Information  Primary Emergency Contact: anastasiia gee  Mobile Phone: [phone]  Relation: Spouse    Discharge Plan A: Home Health  Discharge Plan B: Home with family      [id number] - [place], [place] - [address]  [address]  [facility] [address]  Phone: [phone] Fax: [phone]      Initial Assessment (most recent)       Adult Discharge Assessment - 08/21/24 0941          Discharge Assessment    Assessment Type Discharge Planning Assessment     Confirmed/corrected address, phone number and insurance Yes     Confirmed Demographics Correct on Facesheet     Source of Information patient     When was your last doctors appointment? 08/15/24   Solis Carr MD - Cardiology    Communicated JEREMIAH with patient/caregiver Date not available/Unable to determine     Reason For Admission Acute on chronic combined systolic and diastolic congestive heart failure     People in Home spouse     Facility Arrived From: [facility]     Do you expect to return to your current living situation? No   Patient to DC home with [facility]    Do you have help at home or someone to help you manage your care at home? Yes     Who are your caregiver(s) and their phone number(s)? Anastasiia Gee - spouse     Prior to hospitilization cognitive status: Alert/Oriented     Current cognitive status: Alert/Oriented      Walking or Climbing Stairs Difficulty yes     Walking or Climbing Stairs ambulation difficulty, requires equipment     Mobility Management rolling walker     Dressing/Bathing Difficulty yes     Dressing/Bathing bathing difficulty, requires equipment     Dressing/Bathing Management shower chair     Home Accessibility wheelchair accessible     Equipment Currently Used at Home walker, rolling;shower chair     Readmission within 30 days? Yes     Patient currently being followed by outpatient case management? No     Do you currently have service(s) that help you manage your care at home? No     Is the pt/caregiver preference to resume services with current agency Yes     Do you take prescription medications? Yes     Do you have prescription coverage? Yes     Coverage [employer] Managed Medicare     Do you have any problems affording any of your prescribed medications? No     Is the patient taking medications as prescribed? yes     Who is going to help you get home at discharge? Tony Shanks - spouse     How do you get to doctors appointments? health plan transportation     Are you on dialysis? Yes     Dialysis Name and Scheduled days [facility] 11:05 am     Do you take coumadin? No     Discharge Plan A Home Health     Discharge Plan B Home with family     DME Needed Upon Discharge  none     Discharge Plan discussed with: Patient     Transition of Care Barriers None        Transportation Needs    Has the lack of transportation kept you from medical appointments, meetings, work or from getting things needed for daily living? No                     Readmission Assessment (most recent)       Readmission Assessment - 08/21/24 0941          Readmission    Was this a planned readmission? No     Why were you hospitalized in the last 30 days? Renal failure     Why were you readmitted? New medical problem     When you left the hospital where did you go? SNF     Did patient/caregiver refused recommended DC plan? No     Did you  try to manage your symptoms your self? No     Did you call anyone? No     Why? At [facility] for placement     Did you try to see or did see a doctor or nurse before you came? Yes     Were you seen? Yes     Did you have  a follow-up appointment on discharge? No                    Anticipated DC dispo: home with [facility]  Prior Level of Function: independent with ADLs  People in home: Tony Shanks - spouse     Comments:  SW met with patient at bedside to introduce role and discuss discharge planning. Patient's spouse will be help at home and can provide transport at time of discharge. Confirmed demographics, insurance, and emergency contacts. Patient was going to be discharged from [facility] tomorrow, with [facility]. Upon discharge, Patient will be set up with [facility]. SW updated Patient's whiteboard with contact information and anticipated DC plan. CM discharge needs depends on hospital progress. SW will continue following to assist with other needs.

## 2024-08-21 NOTE — PROGRESS NOTES
Nephrology Progress Note     History of Present Illness     ESRD currently on dialysis MWF at [facility] while he has [place] rehab but typically MWF at [facility].  He presented to the ER due to shortness of breath and at the behest of a certain Dr. Carr due to pulmonary edema on a recent chest x-ray.  He had recently suffered a cardiac arrest after dialysis and has a history of severe native vessel coronary artery disease with a history of bypass graft in the past recent stent placement prior to that cardiac arrest.  He also has a life vest in place.  Therefore, it was recommended that he get daily dialysis.  However, this was not relayed to a nephrologist and as such this did not happen.  He is also to be discharged from [place] 8/21 and is going back to his home dialysis unit at [facility] on Friday.  Therefore, it would be hard to get him dialyzed serially in the outpatient setting with that transition.      Interval History   Overnight/currently:  Status post dialysis last evening with 2.5 L of UF and today with plans to get 3+ L of UF which should get him beyond his dry weight.  He is feeling well in his blood pressure is actually holding quite nicely.  Lying flat in bed without any shortness of breath.        Allergies:    is allergic to adhesive tape-silicones.    Current medications:   Scheduled Meds:   amiodarone  400 mg Oral Daily    apixaban  2.5 mg Oral BID    aspirin  81 mg Oral Daily    atorvastatin  40 mg Oral Daily    clopidogreL  75 mg Oral Daily    metoprolol tartrate  12.5 mg Oral BID    pantoprazole  40 mg Oral Daily    QUEtiapine  25 mg Oral QHS    sacubitriL-valsartan  1 tablet Oral BID    sevelamer carbonate  800 mg Oral TID WM     Continuous Infusions:  PRN Meds:.  Current Facility-Administered Medications:     dextrose 10%, 12.5 g, Intravenous, PRN    dextrose 10%, 25 g, Intravenous, PRN    glucagon (human recombinant), 1 mg, Intramuscular, PRN    glucose, 16 g, Oral, PRN     "glucose, 24 g, Oral, PRN    hydrALAZINE, 10 mg, Intravenous, Q4H PRN    insulin aspart U-100, 0-5 Units, Subcutaneous, QID (AC + HS) PRN    lactulose, 15 g, Oral, Q6H PRN    loperamide, 4 mg, Oral, BID PRN    melatonin, 6 mg, Oral, Nightly PRN    ondansetron, 4 mg, Intravenous, Q6H PRN    prochlorperazine, 5 mg, Intravenous, Q6H PRN    sodium chloride 0.9%, 250 mL, Intravenous, PRN    sodium chloride 0.9%, 250 mL, Intravenous, PRN    sodium chloride 0.9%, 10 mL, Intravenous, PRN     Physical Examination     VS/Measurements    BP (!) 131/110 (BP Location: Right arm, Patient Position: Lying)   Pulse 77   Temp 98.5 °F (36.9 °C) (Axillary)   Resp 20   Ht 5' 7" (1.702 m)   Wt 78.1 kg (172 lb 2.9 oz)   SpO2 95%   BMI 26.97 kg/m²         General:  Moderately built, not in any distress.  Neck:  Supple,   Respiratory: Non-labored,  Lungs are clear to auscultation.    Cardiovascular:  Normal rate, Regular rhythm.   Abdomen:  Soft, Non-tender, Normal bowel sounds.   Muskuloskeletal:  No pedal edema          Laboratory Results   Today's Lab Results :    Recent Results (from the past 24 hour(s))   POCT glucose    Collection Time: 08/20/24  6:01 PM   Result Value Ref [place]    POCT Glucose 124 (H) 70 - 110 mg/dL   POCT glucose    Collection Time: 08/21/24  5:12 AM   Result Value Ref Range    POCT Glucose 71 70 - 110 mg/dL   Renal Function Panel    Collection Time: 08/21/24  6:27 AM   Result Value Ref [place]    Glucose 89 70 - 110 mg/dL    Sodium 135 (L) 136 - 145 mmol/L    Potassium 4.0 3.5 - 5.1 mmol/L    Chloride 106 95 - 110 mmol/L    CO2 22 (L) 23 - 29 mmol/L    BUN 11 8 - 23 mg/dL    Calcium 9.1 8.7 - 10.5 mg/dL    Creatinine 3.0 (H) 0.5 - 1.4 mg/dL    Albumin 2.6 (L) 3.5 - 5.2 g/dL    Phosphorus 2.7 2.7 - 4.5 mg/dL    eGFR 22 (A) >60 mL/min/1.73 m^2    Anion Gap 7 (L) 8 - 16 mmol/L   Magnesium    Collection Time: 08/21/24  6:27 AM   Result Value Ref [place]    Magnesium 2.0 1.6 - 2.6 mg/dL   CBC Auto Differential    " Collection Time: 08/21/24  6:27 AM   Result Value Ref [place]    WBC 4.89 3.90 - 12.70 K/uL    RBC 2.78 (L) 4.60 - 6.20 M/uL    Hemoglobin 8.9 (L) 14.0 - 18.0 g/dL    Hematocrit 27.9 (L) 40.0 - 54.0 %     (H) 82 - 98 fL    MCH 32.0 (H) 27.0 - 31.0 pg    MCHC 31.9 (L) 32.0 - 36.0 g/dL    RDW 15.9 (H) 11.5 - 14.5 %    Platelets 223 150 - 450 K/uL    MPV 9.8 9.2 - 12.9 fL    Immature Granulocytes 0.4 0.0 - 0.5 %    Gran # (ANC) 3.3 1.8 - 7.7 K/uL    Immature Grans (Abs) 0.02 0.00 - 0.04 K/uL    Lymph # 0.7 (L) 1.0 - 4.8 K/uL    Mono # 0.5 0.3 - 1.0 K/uL    Eos # 0.3 0.0 - 0.5 K/uL    Baso # 0.04 0.00 - 0.20 K/uL    nRBC 0 0 /100 WBC    Gran % 67.4 38.0 - 73.0 %    Lymph % 14.7 (L) 18.0 - 48.0 %    Mono % 10.6 4.0 - 15.0 %    Eosinophil % 6.1 0.0 - 8.0 %    Basophil % 0.8 0.0 - 1.9 %    Differential Method Automated        LABS:  Reviewed Yes      Intake/Output Summary (Last 24 hours) at 8/21/2024 1351  Last data filed at 8/21/2024 0858  Gross per 24 hour   Intake 150 ml   Output 2500 ml   Net -2350 ml       Assessment and Plan     Volume overload.  Improving with UF on serial dialysis.  Check chest x-ray after dialysis today.  If still evidence of volume then we will plan for another treatment in the morning.  Otherwise, he can likely be discharged and go back to his regular outpatient schedule on Friday.  However, it is my concern that he may need 4 times weekly dialysis.  I will discuss this with his outpatient nephrologist; Dr. Knowles.    ESRD.  Currently MWF at [facility] while at [facility] but to go back to MWF at [facility] starting this Friday.  As above.       Dialysis access.  Left upper extremity AV fistula functional.     Ischemic cardiomyopathy.  EF of 15-20%.     History of cardiac arrest.  Has LifeVest.     Diabetes mellitus type 2 CKD.  Defer to Hospital Medicine.       Hypertension CKD.  Home medications.  UF with dialysis.       Anemia CKD.  No need for JONAS at this time.  Last dose 50 mcg  08/07/2024.     CKD MBD/SHPT renal.  Phosphorus at goal.  Continue calcitriol and Sensipar.    Disposition.  Again, if his chest x-ray is clear then I would have primary tendon discuss this with Cardiology as to whether or not he is to stay.  From my standpoint, he could go and follow up outpatient dialysis on Friday.  Otherwise, if his chest x-ray still shows evidence of volume then we will plan for another treatment in the morning.        ________________________________________________  Marino Yap

## 2024-08-21 NOTE — PROGRESS NOTES
08/21/24 1500   Required for all Hemodialysis Patients   Hepatitis Status other (see comments)   Handoff Report   Received From Lito Hu RN   Given To Marlin Gill LPN   Treatment Type   Treatment Type Acute   Vital Signs   Temp 98.1 °F (36.7 °C)   Temp Source Axillary   Pulse 74   Heart Rate Source Monitor   Resp 18   SpO2 97 %   Pulse Oximetry Type Intermittent   Probe Placed On (Pulse Ox) Right:;finger   Device (Oxygen Therapy) room air   /72   MAP (mmHg) 89   BP Location Right arm   BP Method Automatic   Patient Position Lying        Hemodialysis AV Fistula Left upper arm   No placement date or time found.   Present Prior to Hospital Arrival?: Yes  Location: Left upper arm   Site Assessment Clean;Dry;Intact;No redness;No swelling;No warmth;No drainage   Patency Bruit;Thrill;Present   Status Deaccessed   Dressing Intervention Integrity maintained   Dressing Status Clean;Dry;Intact   Site Condition No complications   Dressing Gauze   Drainage Description Other (Comment)  (pressure drsg clean dry and intact. no shadowing noted.)   Post-Hemodialysis Assessment   Rinseback Volume (mL) 250 mL   Blood Volume Processed (Liters) 75 L   Dialyzer Clearance Lightly streaked   Duration of Treatment 240 minutes   Additional Fluid Intake (mL) 500 mL   Total UF (mL) 3600 mL   Net Fluid Removal 3100   Patient Response to Treatment tolerated   Post-Hemodialysis Comments 4 hr I-HDTx  completed as planned, NET UF goal reached without difficulty, Blood reperfused and pt de accessed according to P&P. Report to follow with primary Nurse as same.

## 2024-08-21 NOTE — PLAN OF CARE
Discussed poc with pt, pt verbalized understanding    Purposeful rounding every 2hours    VS wnl  Cardiac monitoring in use, pt is NSR, tele monitor # [id number]  Blood glucose monitoring   Fall precautions in place, remains injury free    IVFs  Bed locked at lowest position  Call light within reach    Chart check complete  Will cont with POC

## 2024-08-21 NOTE — PROGRESS NOTES
[facility]  Cardiology  Progress Note    Patient Name: Silvino Robles  MRN: [id number]  Admission Date: 8/20/2024  Hospital Length of Stay: 1 days  Code Status: Full Code   Attending Physician: Richar Aguero MD   Primary Care Physician: Cristiana, Primary Doctor  Expected Discharge Date:   Principal Problem:Acute on chronic combined systolic and diastolic congestive heart failure    Subjective:   HPI:  Cardiology consutl for CHF exacerbation and recent s/p PCI  63 yo male, PMHx of recentl abort cardiac arrest h/o cath and later L subclaVIAN STENT, CAD s/pcabg,type 2 DM, CHFrEF, HTNa-fib-on Eliquis, ESRD on HD, nephrotic syndrome, followed by Dr. Knowles,   07/24 admitted for aborted cardiac arrest. Tht cath showed multivessel dz, patent LIMA to LAD and SVG to om1. Later stenting of Left subclavian  Echo showed EF 20%  After d/c to [facility], on HD 3 times a week and remains fluid overloaded  EKG NSR old real infarct       Hospital Course:   8/21/24-Patient seen and examined today during HD. Feels ok. No AEON. Labs/CXR reviewed.         Review of Systems   Constitutional: Positive for malaise/fatigue.   Cardiovascular:  Positive for dyspnea on exertion.   Respiratory:  Positive for shortness of breath.      Objective:     Vital Signs (Most Recent):  Temp: 98.5 °F (36.9 °C) (08/21/24 1200)  Pulse: 77 (08/21/24 1200)  Resp: 20 (08/21/24 1200)  BP: (!) 131/110 (08/21/24 1200)  SpO2: 95 % (08/21/24 1030) Vital Signs (24h Range):  Temp:  [97.9 °F (36.6 °C)-98.5 °F (36.9 °C)] 98.5 °F (36.9 °C)  Pulse:  [55-77] 77  Resp:  [16-23] 20  SpO2:  [95 %-99 %] 95 %  BP: ()/() 131/110     Weight: 78.1 kg (172 lb 2.9 oz)  Body mass index is 26.97 kg/m².     SpO2: 95 %         Intake/Output Summary (Last 24 hours) at 8/21/2024 1410  Last data filed at 8/21/2024 0858  Gross per 24 hour   Intake 150 ml   Output 2500 ml   Net -2350 ml       Lines/Drains/Airways       Peripheral Intravenous Line  Duration                   Hemodialysis AV Fistula Left upper arm -- days         Peripheral IV - Single Lumen 08/20/24 1628 20 G Anterior;Proximal;Right Forearm <1 day                       Physical Exam  Vitals and nursing note reviewed.   Constitutional:       General: He is not in acute distress.     Appearance: Normal appearance. He is well-developed. He is not diaphoretic.   HENT:      Head: Normocephalic and atraumatic.   Eyes:      General:         Right eye: No discharge.         Left eye: No discharge.      Pupils: Pupils are equal, round, and reactive to light.   Cardiovascular:      Rate and Rhythm: Normal rate and regular rhythm.      Heart sounds: Normal heart sounds, S1 normal and S2 normal. No murmur heard.     Comments: LifeVest  Pulmonary:      Effort: Pulmonary effort is normal. No respiratory distress.      Breath sounds: Rales present.      Comments: Diminished at bases  Abdominal:      General: There is no distension.   Skin:     General: Skin is warm and dry.      Findings: No erythema.   Neurological:      Mental Status: He is alert and oriented to person, place, and time.   Psychiatric:         Behavior: Behavior normal.            Significant Labs: CMP   Recent Labs   Lab 08/20/24  1332 08/21/24  0627    135*   K 4.2 4.0   CL 98 106   CO2 23 22*   * 89   BUN 26* 11   CREATININE 4.9* 3.0*   CALCIUM 10.3 9.1   PROT 7.6  --    ALBUMIN 3.5 2.6*   BILITOT 0.7  --    ALKPHOS 109  --    AST 18  --    ALT 17  --    ANIONGAP 17* 7*   , CBC   Recent Labs   Lab 08/20/24  1332 08/21/24  0627   WBC 5.40 4.89   HGB 10.7* 8.9*   HCT 33.4* 27.9*    223   , Troponin   Recent Labs   Lab 08/20/24  1332   TROPONINI 0.079*   , and All pertinent lab results from the last 24 hours have been reviewed.    Significant Imaging: Echocardiogram: Transthoracic echo (TTE) complete (Cupid Only):   Results for orders placed or performed during the hospital encounter of 07/16/24   Echo   Result Value Ref Range    BSA 1.93 m2    EF  20 %    Narrative      Left Ventricle: Regional wall motion abnormalities present. Septal   motion is consistent with post-operative status. There is severely reduced   systolic function with a visually estimated ejection fraction of 15 - 20%.   Ejection fraction by visual approximation is 20%.    There is an impella that is adequately placed.      and EKG: Reviewed  Assessment and Plan:   Patient with known ICM who presents with volume overload. Stable this AM, undergoing HD. Mgmt as per nephrology. Continue OMT.    * Acute on chronic combined systolic and diastolic congestive heart failure  Patient is identified as having Systolic (HFrEF) heart failure that is Acute on chronic. CHF is currently uncontrolled due to Continued edema of extremities. Latest ECHO performed and demonstrates- Results for orders placed during the hospital encounter of 07/16/24    Echo    Interpretation Summary    Left Ventricle: Regional wall motion abnormalities present. Septal motion is consistent with post-operative status. There is severely reduced systolic function with a visually estimated ejection fraction of 15 - 20%. Ejection fraction by visual approximation is 20%.    There is an impella that is adequately placed.  . Continue Beta Blocker, Furosemide, and ARNI and monitor clinical status closely. Monitor on telemetry. Patient is on CHF pathway.  Monitor strict Is&Os and daily weights.  Place on fluid restriction of 1.5 L. Cardiology has been consulted. Continue to stress to patient importance of self efficacy and  on diet for CHF. Last BNP reviewed- and noted below   Recent Labs   Lab 08/20/24  1332   BNP >4,900*         CHFrEF fluid overloaded  Continue toprolXL entrestor  HD per nephrology  Fluid restriction 34 oz  Continue amio 400 mg daily and Lifevest due to recent h/o aborted cardiac arrest    8/21/24  -Improving, seen during HD today  -Continue OMT        Troponin level elevated  -Trivial due to demand  ischemia  -Continue OMT    Paroxysmal atrial fibrillation  On SR  Continue Eliquis 2.5 mg bid    Coronary artery disease involving coronary bypass graft of native heart  S/p cabg     Continue asa plavix and statin    ESRD needing dialysis  -HD per nephrology        VTE Risk Mitigation (From admission, onward)           Ordered     apixaban tablet 2.5 mg  2 times daily         08/20/24 1654     IP VTE HIGH RISK PATIENT  Once         08/20/24 1654                    Jeni Marquez PA-C  Cardiology  [facility]

## 2024-08-21 NOTE — PROGRESS NOTES
08/21/24 1030   Required for all Hemodialysis Patients   Hepatitis Status other (see comments)   Handoff Report   Received From Marlin Gill LPN   Given To Lito Hu RN   Treatment Type   Treatment Type Acute   Vital Signs   Temp 98.5 °F (36.9 °C)   Temp Source Axillary   Pulse 60   Heart Rate Source Monitor   Resp 20   SpO2 95 %   Pulse Oximetry Type Intermittent   Probe Placed On (Pulse Ox) Right:;finger   Device (Oxygen Therapy) room air   BP (!) 110/59   MAP (mmHg) 75   BP Location Right arm   BP Method Automatic   Patient Position Lying        Hemodialysis AV Fistula Left upper arm   No placement date or time found.   Present Prior to Hospital Arrival?: Yes  Location: Left upper arm   Needle Size 15ga   Site Assessment Clean;Dry;Intact;No redness;No swelling;No warmth;No drainage   Patency Bruit;Thrill;Present   Status Accessed   Flows Good   Dressing Intervention Integrity maintained   Dressing Status Clean;Dry;Intact   Site Condition No complications   Dressing Gauze   Drainage Description Other (Comment)     4 hour I-HDTx initiated and in progress.

## 2024-08-21 NOTE — SUBJECTIVE & OBJECTIVE
Review of Systems   Constitutional: Positive for malaise/fatigue.   Cardiovascular:  Positive for dyspnea on exertion.   Respiratory:  Positive for shortness of breath.      Objective:     Vital Signs (Most Recent):  Temp: 98.5 °F (36.9 °C) (08/21/24 1200)  Pulse: 77 (08/21/24 1200)  Resp: 20 (08/21/24 1200)  BP: (!) 131/110 (08/21/24 1200)  SpO2: 95 % (08/21/24 1030) Vital Signs (24h Range):  Temp:  [97.9 °F (36.6 °C)-98.5 °F (36.9 °C)] 98.5 °F (36.9 °C)  Pulse:  [55-77] 77  Resp:  [16-23] 20  SpO2:  [95 %-99 %] 95 %  BP: ()/() 131/110     Weight: 78.1 kg (172 lb 2.9 oz)  Body mass index is 26.97 kg/m².     SpO2: 95 %         Intake/Output Summary (Last 24 hours) at 8/21/2024 1410  Last data filed at 8/21/2024 0858  Gross per 24 hour   Intake 150 ml   Output 2500 ml   Net -2350 ml       Lines/Drains/Airways       Peripheral Intravenous Line  Duration                  Hemodialysis AV Fistula Left upper arm -- days         Peripheral IV - Single Lumen 08/20/24 1628 20 G Anterior;Proximal;Right Forearm <1 day                       Physical Exam  Vitals and nursing note reviewed.   Constitutional:       General: He is not in acute distress.     Appearance: Normal appearance. He is well-developed. He is not diaphoretic.   HENT:      Head: Normocephalic and atraumatic.   Eyes:      General:         Right eye: No discharge.         Left eye: No discharge.      Pupils: Pupils are equal, round, and reactive to light.   Cardiovascular:      Rate and Rhythm: Normal rate and regular rhythm.      Heart sounds: Normal heart sounds, S1 normal and S2 normal. No murmur heard.     Comments: LifeVest  Pulmonary:      Effort: Pulmonary effort is normal. No respiratory distress.      Breath sounds: Rales present.      Comments: Diminished at bases  Abdominal:      General: There is no distension.   Skin:     General: Skin is warm and dry.      Findings: No erythema.   Neurological:      Mental Status: He is alert and  oriented to person, place, and time.   Psychiatric:         Behavior: Behavior normal.            Significant Labs: CMP   Recent Labs   Lab 08/20/24  1332 08/21/24  0627    135*   K 4.2 4.0   CL 98 106   CO2 23 22*   * 89   BUN 26* 11   CREATININE 4.9* 3.0*   CALCIUM 10.3 9.1   PROT 7.6  --    ALBUMIN 3.5 2.6*   BILITOT 0.7  --    ALKPHOS 109  --    AST 18  --    ALT 17  --    ANIONGAP 17* 7*   , CBC   Recent Labs   Lab 08/20/24  1332 08/21/24  0627   WBC 5.40 4.89   HGB 10.7* 8.9*   HCT 33.4* 27.9*    223   , Troponin   Recent Labs   Lab 08/20/24  1332   TROPONINI 0.079*   , and All pertinent lab results from the last 24 hours have been reviewed.    Significant Imaging: Echocardiogram: Transthoracic echo (TTE) complete (Cupid Only):   Results for orders placed or performed during the hospital encounter of 07/16/24   Echo   Result Value Ref Range    BSA 1.93 m2    EF 20 %    Narrative      Left Ventricle: Regional wall motion abnormalities present. Septal   motion is consistent with post-operative status. There is severely reduced   systolic function with a visually estimated ejection fraction of 15 - 20%.   Ejection fraction by visual approximation is 20%.    There is an impella that is adequately placed.      and EKG: Reviewed

## 2024-08-21 NOTE — PROGRESS NOTES
Inpatient consult to Nephrology  Consult performed by: Marino Yap MD  Consult ordered by: Phyllis Badillo NP      See consultation note done yesterday by myself.

## 2024-08-22 VITALS
WEIGHT: 172.19 LBS | HEART RATE: 61 BPM | DIASTOLIC BLOOD PRESSURE: 57 MMHG | RESPIRATION RATE: 18 BRPM | SYSTOLIC BLOOD PRESSURE: 123 MMHG | HEIGHT: 67 IN | OXYGEN SATURATION: 99 % | BODY MASS INDEX: 27.02 KG/M2 | TEMPERATURE: 99 F

## 2024-08-22 LAB
ALBUMIN SERPL BCP-MCNC: 2.7 G/DL (ref 3.5–5.2)
ALP SERPL-CCNC: 93 U/L (ref 55–135)
ALT SERPL W/O P-5'-P-CCNC: 22 U/L (ref 10–44)
ANION GAP SERPL CALC-SCNC: 10 MMOL/L (ref 8–16)
ANION GAP SERPL CALC-SCNC: 10 MMOL/L (ref 8–16)
AST SERPL-CCNC: 17 U/L (ref 10–40)
BASOPHILS # BLD AUTO: 0.03 K/UL (ref 0–0.2)
BASOPHILS NFR BLD: 0.6 % (ref 0–1.9)
BILIRUB SERPL-MCNC: 0.5 MG/DL (ref 0.1–1)
BUN SERPL-MCNC: 12 MG/DL (ref 8–23)
BUN SERPL-MCNC: 12 MG/DL (ref 8–23)
CALCIUM SERPL-MCNC: 9.2 MG/DL (ref 8.7–10.5)
CALCIUM SERPL-MCNC: 9.2 MG/DL (ref 8.7–10.5)
CHLORIDE SERPL-SCNC: 106 MMOL/L (ref 95–110)
CHLORIDE SERPL-SCNC: 106 MMOL/L (ref 95–110)
CO2 SERPL-SCNC: 26 MMOL/L (ref 23–29)
CO2 SERPL-SCNC: 26 MMOL/L (ref 23–29)
CREAT SERPL-MCNC: 3.2 MG/DL (ref 0.5–1.4)
CREAT SERPL-MCNC: 3.2 MG/DL (ref 0.5–1.4)
DIFFERENTIAL METHOD BLD: ABNORMAL
EOSINOPHIL # BLD AUTO: 0.3 K/UL (ref 0–0.5)
EOSINOPHIL NFR BLD: 5.2 % (ref 0–8)
ERYTHROCYTE [DISTWIDTH] IN BLOOD BY AUTOMATED COUNT: 16.1 % (ref 11.5–14.5)
EST. GFR  (NO RACE VARIABLE): 21 ML/MIN/1.73 M^2
EST. GFR  (NO RACE VARIABLE): 21 ML/MIN/1.73 M^2
GLUCOSE SERPL-MCNC: 77 MG/DL (ref 70–110)
GLUCOSE SERPL-MCNC: 77 MG/DL (ref 70–110)
HCT VFR BLD AUTO: 28.2 % (ref 40–54)
HGB BLD-MCNC: 8.9 G/DL (ref 14–18)
IMM GRANULOCYTES # BLD AUTO: 0.01 K/UL (ref 0–0.04)
IMM GRANULOCYTES NFR BLD AUTO: 0.2 % (ref 0–0.5)
LYMPHOCYTES # BLD AUTO: 1 K/UL (ref 1–4.8)
LYMPHOCYTES NFR BLD: 19.1 % (ref 18–48)
MCH RBC QN AUTO: 31.9 PG (ref 27–31)
MCHC RBC AUTO-ENTMCNC: 31.6 G/DL (ref 32–36)
MCV RBC AUTO: 101 FL (ref 82–98)
MONOCYTES # BLD AUTO: 0.7 K/UL (ref 0.3–1)
MONOCYTES NFR BLD: 13.7 % (ref 4–15)
NEUTROPHILS # BLD AUTO: 3.1 K/UL (ref 1.8–7.7)
NEUTROPHILS NFR BLD: 61.2 % (ref 38–73)
NRBC BLD-RTO: 0 /100 WBC
PLATELET # BLD AUTO: 254 K/UL (ref 150–450)
PMV BLD AUTO: 9.8 FL (ref 9.2–12.9)
POCT GLUCOSE: 85 MG/DL (ref 70–110)
POTASSIUM SERPL-SCNC: 4.6 MMOL/L (ref 3.5–5.1)
POTASSIUM SERPL-SCNC: 4.6 MMOL/L (ref 3.5–5.1)
PROT SERPL-MCNC: 5.5 G/DL (ref 6–8.4)
RBC # BLD AUTO: 2.79 M/UL (ref 4.6–6.2)
SODIUM SERPL-SCNC: 142 MMOL/L (ref 136–145)
SODIUM SERPL-SCNC: 142 MMOL/L (ref 136–145)
WBC # BLD AUTO: 4.98 K/UL (ref 3.9–12.7)

## 2024-08-22 PROCEDURE — 36415 COLL VENOUS BLD VENIPUNCTURE: CPT | Performed by: NURSE PRACTITIONER

## 2024-08-22 PROCEDURE — 25000003 PHARM REV CODE 250: Performed by: NURSE PRACTITIONER

## 2024-08-22 PROCEDURE — 99232 SBSQ HOSP IP/OBS MODERATE 35: CPT | Mod: ,,, | Performed by: INTERNAL MEDICINE

## 2024-08-22 PROCEDURE — 85025 COMPLETE CBC W/AUTO DIFF WBC: CPT | Performed by: NURSE PRACTITIONER

## 2024-08-22 PROCEDURE — 80053 COMPREHEN METABOLIC PANEL: CPT | Performed by: NURSE PRACTITIONER

## 2024-08-22 RX ORDER — LACTULOSE 10 G/15ML
10 SOLUTION ORAL DAILY PRN
Qty: 300 ML | Refills: 0 | Status: SHIPPED | OUTPATIENT
Start: 2024-08-22 | End: 2024-09-21

## 2024-08-22 RX ORDER — AMIODARONE HYDROCHLORIDE 200 MG/1
400 TABLET ORAL DAILY
Qty: 60 TABLET | Refills: 1 | Status: SHIPPED | OUTPATIENT
Start: 2024-08-22 | End: 2025-08-22

## 2024-08-22 RX ORDER — AMLODIPINE BESYLATE 5 MG/1
5 TABLET ORAL DAILY
Start: 2024-08-22 | End: 2024-08-22

## 2024-08-22 RX ORDER — AMLODIPINE BESYLATE 5 MG/1
5 TABLET ORAL DAILY
Qty: 30 TABLET | Refills: 1 | Status: SHIPPED | OUTPATIENT
Start: 2024-08-22 | End: 2025-08-22

## 2024-08-22 RX ORDER — ATORVASTATIN CALCIUM 40 MG/1
40 TABLET, FILM COATED ORAL DAILY
Qty: 30 TABLET | Refills: 1 | Status: SHIPPED | OUTPATIENT
Start: 2024-08-22

## 2024-08-22 RX ORDER — CLOPIDOGREL BISULFATE 75 MG/1
75 TABLET ORAL DAILY
Qty: 30 TABLET | Refills: 1 | Status: SHIPPED | OUTPATIENT
Start: 2024-08-22 | End: 2025-08-22

## 2024-08-22 RX ORDER — SACUBITRIL AND VALSARTAN 24; 26 MG/1; MG/1
1 TABLET, FILM COATED ORAL 2 TIMES DAILY
Qty: 60 TABLET | Refills: 1 | Status: SHIPPED | OUTPATIENT
Start: 2024-08-22

## 2024-08-22 RX ORDER — NAPROXEN SODIUM 220 MG/1
81 TABLET, FILM COATED ORAL DAILY
Qty: 30 TABLET | Refills: 1 | Status: SHIPPED | OUTPATIENT
Start: 2024-08-22

## 2024-08-22 RX ORDER — METOPROLOL TARTRATE 25 MG/1
12.5 TABLET, FILM COATED ORAL 2 TIMES DAILY
Qty: 60 TABLET | Refills: 1 | Status: SHIPPED | OUTPATIENT
Start: 2024-08-22 | End: 2025-08-22

## 2024-08-22 RX ORDER — PANTOPRAZOLE SODIUM 40 MG/1
40 TABLET, DELAYED RELEASE ORAL DAILY
Qty: 30 TABLET | Refills: 1 | Status: SHIPPED | OUTPATIENT
Start: 2024-08-22

## 2024-08-22 RX ORDER — QUETIAPINE FUMARATE 25 MG/1
25 TABLET, FILM COATED ORAL NIGHTLY
Qty: 30 TABLET | Refills: 1 | Status: SHIPPED | OUTPATIENT
Start: 2024-08-22

## 2024-08-22 RX ORDER — LACTULOSE 10 G/15ML
15 SOLUTION ORAL EVERY 6 HOURS PRN
Start: 2024-08-22 | End: 2024-08-22

## 2024-08-22 RX ORDER — SEVELAMER CARBONATE 800 MG/1
800 TABLET, FILM COATED ORAL
Qty: 270 TABLET | Refills: 1 | Status: SHIPPED | OUTPATIENT
Start: 2024-08-22

## 2024-08-22 RX ADMIN — CLOPIDOGREL BISULFATE 75 MG: 75 TABLET ORAL at 09:08

## 2024-08-22 RX ADMIN — METOPROLOL TARTRATE 12.5 MG: 25 TABLET, FILM COATED ORAL at 09:08

## 2024-08-22 RX ADMIN — PANTOPRAZOLE SODIUM 40 MG: 40 TABLET, DELAYED RELEASE ORAL at 09:08

## 2024-08-22 RX ADMIN — SEVELAMER CARBONATE 800 MG: 800 TABLET, FILM COATED ORAL at 09:08

## 2024-08-22 RX ADMIN — AMIODARONE HYDROCHLORIDE 400 MG: 200 TABLET ORAL at 09:08

## 2024-08-22 RX ADMIN — SEVELAMER CARBONATE 800 MG: 800 TABLET, FILM COATED ORAL at 12:08

## 2024-08-22 RX ADMIN — ASPIRIN 81 MG CHEWABLE TABLET 81 MG: 81 TABLET CHEWABLE at 09:08

## 2024-08-22 RX ADMIN — SACUBITRIL AND VALSARTAN 1 TABLET: 24; 26 TABLET, FILM COATED ORAL at 09:08

## 2024-08-22 RX ADMIN — APIXABAN 2.5 MG: 2.5 TABLET, FILM COATED ORAL at 09:08

## 2024-08-22 RX ADMIN — ATORVASTATIN CALCIUM 40 MG: 40 TABLET, FILM COATED ORAL at 09:08

## 2024-08-22 NOTE — PROGRESS NOTES
Nephrology Progress Note     History of Present Illness   ESRD currently on dialysis MWF at [facility] while he has [place] rehab but typically MWF at [facility].  He presented to the ER due to shortness of breath and at the behest of a certain Dr. Carr due to pulmonary edema on a recent chest x-ray.  He had recently suffered a cardiac arrest after dialysis and has a history of severe native vessel coronary artery disease with a history of bypass graft in the past recent stent placement prior to that cardiac arrest.  He also has a life vest in place.  Therefore, it was recommended that he get daily dialysis.  However, this was not relayed to a nephrologist and as such this did not happen.  He is also to be discharged from [facility] 8/21 and is going back to his home dialysis unit at [place] on Friday.  Therefore, it would be hard to get him dialyzed serially in the outpatient setting with that transition.        Interval History     Overnight/currently:  Breathing fairly well.  Had dialysis yesterday.  Counseled patient on interdialytic weight gains limitations to 1.5 L.  Called dialysis unit at [facility] explained need to keep weight optimize and increase dialysis frequency to 4 times a week to maintain control of volume to prevent readmissions.  Discussed with hospitalist discharge plans agreed upon okay to discharge today.     Health Status   Allergies:    is allergic to adhesive tape-silicones.    Current medications:     Current Facility-Administered Medications:     amiodarone tablet 400 mg, 400 mg, Oral, Daily, Phyllis Badillo, NP, 400 mg at 08/22/24 0902    apixaban tablet 2.5 mg, 2.5 mg, Oral, BID, Phyllis Badillo, NP, 2.5 mg at 08/22/24 0901    aspirin chewable tablet 81 mg, 81 mg, Oral, Daily, Phyllis Badillo, NP, 81 mg at 08/22/24 0901    atorvastatin tablet 40 mg, 40 mg, Oral, Daily, Phyllis Badillo, NP, 40 mg at 08/22/24 0902    clopidogreL tablet 75 mg, 75 mg, Oral, Daily, Aby  Phyllis MIMS NP, 75 mg at 08/22/24 0902    dextrose 10% bolus 125 mL 125 mL, 12.5 g, Intravenous, PRN, Phyllis Badillo, NP    dextrose 10% bolus 250 mL 250 mL, 25 g, Intravenous, PRN, Phyllis Badillo, NP    glucagon (human recombinant) injection 1 mg, 1 mg, Intramuscular, PRN, Phyllis Badillo, NP    glucose chewable tablet 16 g, 16 g, Oral, PRN, Phyllis Badillo, NP    glucose chewable tablet 24 g, 24 g, Oral, PRN, Phyllis Badilol, NP    hydrALAZINE injection 10 mg, 10 mg, Intravenous, Q4H PRN, Phyllis Badillo, NP    insulin aspart U-100 pen 0-5 Units, 0-5 Units, Subcutaneous, QID (AC + HS) PRN, Phyllis Badillo, NP    lactulose 20 gram/30 mL solution Soln 15 g, 15 g, Oral, Q6H PRN, Phyllis Badillo, NP    loperamide capsule 4 mg, 4 mg, Oral, BID PRN, Richar Aguero MD, 4 mg at 08/20/24 1713    melatonin tablet 6 mg, 6 mg, Oral, Nightly PRN, Phyllis Badillo NP    metoprolol tartrate (LOPRESSOR) split tablet 12.5 mg, 12.5 mg, Oral, BID, Phyllis Badillo, CHIKA, 12.5 mg at 08/22/24 0901    ondansetron injection 4 mg, 4 mg, Intravenous, Q6H PRN, Phyllis Badillo, CHIKA    pantoprazole EC tablet 40 mg, 40 mg, Oral, Daily, Phyllis Badillo, NP, 40 mg at 08/22/24 0902    prochlorperazine injection Soln 5 mg, 5 mg, Intravenous, Q6H PRN, Phyllis Badillo, CHIKA    QUEtiapine tablet 25 mg, 25 mg, Oral, QHS, Phyllis Badillo, CHIKA, 25 mg at 08/21/24 2043    sacubitriL-valsartan 24-26 mg per tablet 1 tablet, 1 tablet, Oral, BID, Phyllis Badillo NP, 1 tablet at 08/22/24 0901    sevelamer carbonate tablet 800 mg, 800 mg, Oral, TID WM, Phyllis Badillo NP, 800 mg at 08/22/24 0909    sodium chloride 0.9% bolus 250 mL 250 mL, 250 mL, Intravenous, PRN, Marino Yap MD    sodium chloride 0.9% bolus 250 mL 250 mL, 250 mL, Intravenous, PRN, Marino Yap MD    sodium chloride 0.9% flush 10 mL, 10 mL, Intravenous, PRN, Phyllis Badillo NP     Physical Examination   VS/Measurements   BP (!) 140/65 (BP Location: Right leg,  "Patient Position: Lying)   Pulse 70   Temp 98.7 °F (37.1 °C) (Oral)   Resp 18   Ht 5' 7" (1.702 m)   Wt 78.1 kg (172 lb 2.9 oz)   SpO2 98%   BMI 26.97 kg/m²      General:  Alert and oriented X3, No acute distress.         Nutritional status: Neck:  Supple, No lymphadenopathy.     Respiratory:  Lungs are clear to auscultation, Respirations are non-labored, Symmetrical chest wall expansion.    Cardiovascular:  Normal rate, Regular rhythm.   Gastrointestinal:  Soft, Non-tender, Normal bowel sounds.   Integumentary:  Warm, Dry.  Left upper arm AV fistula  Psychiatric:  Cooperative, Appropriate mood & affect.        Review / Management   Laboratory Results   Today's Lab Results :    Recent Results (from the past 24 hour(s))   POCT glucose    Collection Time: 08/21/24  9:30 PM   Result Value Ref Range    POCT Glucose 139 (H) 70 - 110 mg/dL   Basic metabolic panel    Collection Time: 08/22/24  5:32 AM   Result Value Ref Range    Sodium 142 136 - 145 mmol/L    Potassium 4.6 3.5 - 5.1 mmol/L    Chloride 106 95 - 110 mmol/L    CO2 26 23 - 29 mmol/L    Glucose 77 70 - 110 mg/dL    BUN 12 8 - 23 mg/dL    Creatinine 3.2 (H) 0.5 - 1.4 mg/dL    Calcium 9.2 8.7 - 10.5 mg/dL    Anion Gap 10 8 - 16 mmol/L    eGFR 21 (A) >60 mL/min/1.73 m^2   CBC Auto Differential    Collection Time: 08/22/24  5:32 AM   Result Value Ref Range    WBC 4.98 3.90 - 12.70 K/uL    RBC 2.79 (L) 4.60 - 6.20 M/uL    Hemoglobin 8.9 (L) 14.0 - 18.0 g/dL    Hematocrit 28.2 (L) 40.0 - 54.0 %     (H) 82 - 98 fL    MCH 31.9 (H) 27.0 - 31.0 pg    MCHC 31.6 (L) 32.0 - 36.0 g/dL    RDW 16.1 (H) 11.5 - 14.5 %    Platelets 254 150 - 450 K/uL    MPV 9.8 9.2 - 12.9 fL    Immature Granulocytes 0.2 0.0 - 0.5 %    Gran # (ANC) 3.1 1.8 - 7.7 K/uL    Immature Grans (Abs) 0.01 0.00 - 0.04 K/uL    Lymph # 1.0 1.0 - 4.8 K/uL    Mono # 0.7 0.3 - 1.0 K/uL    Eos # 0.3 0.0 - 0.5 K/uL    Baso # 0.03 0.00 - 0.20 K/uL    nRBC 0 0 /100 WBC    Gran % 61.2 38.0 - 73.0 %    " Lymph % 19.1 18.0 - 48.0 %    Mono % 13.7 4.0 - 15.0 %    Eosinophil % 5.2 0.0 - 8.0 %    Basophil % 0.6 0.0 - 1.9 %    Differential Method Automated    Comprehensive Metabolic Panel    Collection Time: 08/22/24  5:32 AM   Result Value Ref Range    Sodium 142 136 - 145 mmol/L    Potassium 4.6 3.5 - 5.1 mmol/L    Chloride 106 95 - 110 mmol/L    CO2 26 23 - 29 mmol/L    Glucose 77 70 - 110 mg/dL    BUN 12 8 - 23 mg/dL    Creatinine 3.2 (H) 0.5 - 1.4 mg/dL    Calcium 9.2 8.7 - 10.5 mg/dL    Total Protein 5.5 (L) 6.0 - 8.4 g/dL    Albumin 2.7 (L) 3.5 - 5.2 g/dL    Total Bilirubin 0.5 0.1 - 1.0 mg/dL    Alkaline Phosphatase 93 55 - 135 U/L    AST 17 10 - 40 U/L    ALT 22 10 - 44 U/L    eGFR 21 (A) >60 mL/min/1.73 m^2    Anion Gap 10 8 - 16 mmol/L   POCT glucose    Collection Time: 08/22/24  5:49 AM   Result Value Ref Range    POCT Glucose 85 70 - 110 mg/dL        Impression and Plan   Diagnosis   Decompensated heart failure secondary to volume overload improve with serial dialysis    End-stage renal disease Monday Wednesday Friday [place] plan dialysis outpatient tomorrow counseled on controlling interdialytic weight gains and increasing time increased to 4.5 hours.    Ischemic cardiomyopathy EF 20%    Coronary artery disease CABG cardiac arrest on life vest    Type 2 diabetes the hospital medicine    Hypertension controlled    CKD mineral bone disorder phosphorus controlled continue current management      ______________________________________________  Freedom Rankin MD    This document was created using voice recognition software.  It is possible that there are errors which have persisted after original proofreading.  If there is a question regarding contents of this document please contact me for clarification.

## 2024-08-22 NOTE — PLAN OF CARE
[facility]  Discharge Final Note    Primary Care Provider: Cristiana Primary Doctor    Expected Discharge Date: 8/22/2024    Final Discharge Note (most recent)       Final Note - 08/22/24 0950          Final Note    Assessment Type Final Discharge Note     Anticipated Discharge Disposition Home-Health Care Svc        Post-Acute Status    Post-Acute Authorization Home Health     Home Health Status Set-up Complete/Auth obtained     Coverage [employer] Managed Medicare     Discharge Delays None known at this time                     Important Message from Medicare             Contact Info       No, Primary Doctor   Relationship: PCP - General        Next Steps: Follow up in 3 day(s)    Juan Greene MD   Specialty: Nephrology    [facility]  [address] [place] [address] [address] [place] [address]   Phone: [phone]       Next Steps: Follow up in 2 week(s)    [facility]   Specialty: Home Health Services, Home Therapy Services, Home Living Aide Services    [id number] [facility], [address]  [facility] [address]   Phone: [phone]       Next Steps: Follow up today    Instructions: home health          DC Disposition: [facility]   Family Notified: Patient at bedside  Transportation: personal transportation    Patient needed Home Health services set up upon discharge. Patient has [facility] set up and information has been added to Patient's AVS.    Patient has resources to schedule hospital follow up with non-[facility] PCP on AVS.

## 2024-08-22 NOTE — PROGRESS NOTES
[facility]  Cardiology  Progress Note    Patient Name: Silvino Robles  MRN: [id number]  Admission Date: 8/20/2024  Hospital Length of Stay: 2 days  Code Status: Full Code   Attending Physician: Richar Aguero MD   Primary Care Physician: Cristiana, Primary Doctor  Expected Discharge Date: 8/22/2024  Principal Problem:Acute on chronic combined systolic and diastolic congestive heart failure    Subjective:   HPI:  Cardiology consutl for CHF exacerbation and recent s/p PCI  65 yo male, PMHx of recentl abort cardiac arrest h/o cath and later L subclaVIAN STENT, CAD s/pcabg,type 2 DM, CHFrEF, HTNa-fib-on Eliquis, ESRD on HD, nephrotic syndrome, followed by Dr. Knowles,   07/24 admitted for aborted cardiac arrest. Tht cath showed multivessel dz, patent LIMA to LAD and SVG to om1. Later stenting of Left subclavian  Echo showed EF 20%  After d/c to [facility], on HD 3 times a week and remains fluid overloaded  EKG NSR old real infarct    Hospital Course:   8/21/24-Patient seen and examined today during HD. Feels ok. No AEON. Labs/CXR reviewed.     8/22/24-Patient seen and examined today, resting on bedside couch. No CV complaints. SOB much improved. Nephrology following. Labs reviewed.       Review of Systems   Constitutional: Negative.   HENT: Negative.     Eyes: Negative.    Cardiovascular: Negative.    Respiratory: Negative.     Endocrine: Negative.    Hematologic/Lymphatic: Negative.    Skin: Negative.    Musculoskeletal: Negative.    Gastrointestinal: Negative.    Genitourinary: Negative.    Neurological: Negative.    Psychiatric/Behavioral: Negative.     Allergic/Immunologic: Negative.      Objective:     Vital Signs (Most Recent):  Temp: 98.5 °F (36.9 °C) (08/22/24 1149)  Pulse: 61 (08/22/24 1149)  Resp: 18 (08/22/24 1149)  BP: (!) 123/57 (08/22/24 1149)  SpO2: 99 % (08/22/24 1149) Vital Signs (24h Range):  Temp:  [98 °F (36.7 °C)-99.1 °F (37.3 °C)] 98.5 °F (36.9 °C)  Pulse:  [61-89] 61  Resp:  [18] 18  SpO2:  [94 %-99 %]  99 %  BP: ()/(50-72) 123/57     Weight: 78.1 kg (172 lb 2.9 oz)  Body mass index is 26.97 kg/m².     SpO2: 99 %         Intake/Output Summary (Last 24 hours) at 8/22/2024 1242  Last data filed at 8/21/2024 1500  Gross per 24 hour   Intake 500 ml   Output 3600 ml   Net -3100 ml       Lines/Drains/Airways       Peripheral Intravenous Line  Duration                  Hemodialysis AV Fistula Left upper arm -- days         Peripheral IV - Single Lumen 08/20/24 1628 20 G Anterior;Proximal;Right Forearm 1 day                       Physical Exam  Vitals and nursing note reviewed.   Constitutional:       General: He is not in acute distress.     Appearance: Normal appearance. He is well-developed. He is not diaphoretic.   HENT:      Head: Normocephalic and atraumatic.   Eyes:      General:         Right eye: No discharge.         Left eye: No discharge.      Pupils: Pupils are equal, round, and reactive to light.   Cardiovascular:      Rate and Rhythm: Normal rate and regular rhythm.      Heart sounds: Normal heart sounds, S1 normal and S2 normal. No murmur heard.     No S4 sounds.      Comments: Wearing LifeVest  Pulmonary:      Effort: Pulmonary effort is normal. No respiratory distress.      Breath sounds: Normal breath sounds.   Abdominal:      General: There is no distension.   Musculoskeletal:      Right lower leg: No edema.      Left lower leg: No edema.   Skin:     General: Skin is warm and dry.      Findings: No erythema.   Neurological:      Mental Status: He is alert and oriented to person, place, and time.   Psychiatric:         Mood and Affect: Mood normal.         Behavior: Behavior normal.            Significant Labs: CMP   Recent Labs   Lab 08/20/24  1332 08/21/24  0627 08/22/24  0532    135* 142  142   K 4.2 4.0 4.6  4.6   CL 98 106 106  106   CO2 23 22* 26  26   * 89 77  77   BUN 26* 11 12  12   CREATININE 4.9* 3.0* 3.2*  3.2*   CALCIUM 10.3 9.1 9.2  9.2   PROT 7.6  --  5.5*    ALBUMIN 3.5 2.6* 2.7*   BILITOT 0.7  --  0.5   ALKPHOS 109  --  93   AST 18  --  17   ALT 17  --  22   ANIONGAP 17* 7* 10  10   , CBC   Recent Labs   Lab 08/20/24  1332 08/21/24  0627 08/22/24  0532   WBC 5.40 4.89 4.98   HGB 10.7* 8.9* 8.9*   HCT 33.4* 27.9* 28.2*    223 254   , Troponin   Recent Labs   Lab 08/20/24  1332   TROPONINI 0.079*   , and All pertinent lab results from the last 24 hours have been reviewed.    Significant Imaging: Echocardiogram: Transthoracic echo (TTE) complete (Cupid Only):   Results for orders placed or performed during the hospital encounter of 07/16/24   Echo   Result Value Ref Range    BSA 1.93 m2    EF 20 %    Narrative      Left Ventricle: Regional wall motion abnormalities present. Septal   motion is consistent with post-operative status. There is severely reduced   systolic function with a visually estimated ejection fraction of 15 - 20%.   Ejection fraction by visual approximation is 20%.    There is an impella that is adequately placed.      and EKG: Reviewed  Assessment and Plan:   Patient with known ICM who presents with volume overload. Much improved post HD. Appreciate nephrology. Continue OMT and f/u in clinic.    * Acute on chronic combined systolic and diastolic congestive heart failure  Patient is identified as having Systolic (HFrEF) heart failure that is Acute on chronic. CHF is currently uncontrolled due to Continued edema of extremities. Latest ECHO performed and demonstrates- Results for orders placed during the hospital encounter of 07/16/24    Echo    Interpretation Summary    Left Ventricle: Regional wall motion abnormalities present. Septal motion is consistent with post-operative status. There is severely reduced systolic function with a visually estimated ejection fraction of 15 - 20%. Ejection fraction by visual approximation is 20%.    There is an impella that is adequately placed.  . Continue Beta Blocker, Furosemide, and ARNI and monitor  clinical status closely. Monitor on telemetry. Patient is on CHF pathway.  Monitor strict Is&Os and daily weights.  Place on fluid restriction of 1.5 L. Cardiology has been consulted. Continue to stress to patient importance of self efficacy and  on diet for CHF. Last BNP reviewed- and noted below   Recent Labs   Lab 08/20/24  1332   BNP >4,900*         CHFrEF fluid overloaded  Continue toprolXL entrestor  HD per nephrology  Fluid restriction 34 oz  Continue amio 400 mg daily and Lifevest due to recent h/o aborted cardiac arrest    8/21/24  -Improving, seen during HD today  -Continue OMT    8/22/24  -Much improved  -Continue OMT  -HD schedule as per nephrology      Troponin level elevated  -Trivial due to demand ischemia  -Continue OMT    Paroxysmal atrial fibrillation  On SR  Continue Eliquis 2.5 mg bid    Coronary artery disease involving coronary bypass graft of native heart  S/p cabg     Continue asa plavix and statin    ESRD needing dialysis  -HD per nephrology        VTE Risk Mitigation (From admission, onward)           Ordered     apixaban tablet 2.5 mg  2 times daily         08/20/24 1654     IP VTE HIGH RISK PATIENT  Once         08/20/24 1654                    Jeni Marquez PA-C  Cardiology  [facility]

## 2024-08-22 NOTE — PLAN OF CARE
Problem: Adult Inpatient Plan of Care  Goal: Plan of Care Review  Outcome: Progressing  Goal: Patient-Specific Goal (Individualized)  Outcome: Progressing  Goal: Absence of Hospital-Acquired Illness or Injury  Outcome: Progressing  Goal: Optimal Comfort and Wellbeing  Outcome: Progressing  Goal: Readiness for Transition of Care  Outcome: Progressing     Problem: Hemodialysis  Goal: Safe, Effective Therapy Delivery  Outcome: Progressing  Goal: Effective Tissue Perfusion  Outcome: Progressing  Goal: Absence of Infection Signs and Symptoms  Outcome: Progressing     Problem: Diabetes Comorbidity  Goal: Blood Glucose Level Within Targeted Range  Outcome: Progressing     Problem: Acute Kidney Injury/Impairment  Goal: Fluid and Electrolyte Balance  Outcome: Progressing  Goal: Improved Oral Intake  Outcome: Progressing  Goal: Effective Renal Function  Outcome: Progressing     Problem: Pneumonia  Goal: Fluid Balance  Outcome: Progressing  Goal: Resolution of Infection Signs and Symptoms  Outcome: Progressing  Goal: Effective Oxygenation and Ventilation  Outcome: Progressing

## 2024-08-22 NOTE — DISCHARGE SUMMARY
[facility] Medicine  Discharge Summary      Patient Name: Silvino Robles  MRN: [id number]  CACHORRO: [id number]  Patient Class: IP- Inpatient  Admission Date: 8/20/2024  Hospital Length of Stay: 2 days  Discharge Date and Time:  08/22/2024 3:13 PM  Attending Physician: Richar Aguero MD   Discharging Provider: Phyllis Badillo NP  Primary Care Provider: Cristiana Primary Doctor    Primary Care Team: [facility] MEDICINE D    HPI:   The patient is a 63 yo male with DM, HTN, CAD s/p CABG, PAF-on Eliquis, ESRD on HD MWF, Vtach/NSTEMI Cardiac arrest x2 7/2024, CHF with EF 15-20%, Life vest who presented to ED with fatigue and SOB onset one week ago that gradually worsened. Pt reports right sided fracture/broken ribs from previous CPR. Pt reports compliance with HD.     In the ED, Vital signs and oxygenation stable, Mild bradycardia, labs revealed BNP >4900, trop 0.079 (down trending from last admit 7/2024), CXR- bilateral pleural effusions. EKG- sinus bradycardia, 1st degree AV block, TWI in lateral leads.         Hospital Course:   The patient is a 63 yo male with DM, HTN, CAD s/p CABG, PAF-on Eliquis, ESRD on HD MWF, Vtach/NSTEMI Cardiac arrest x2 7/2024, CHF with EF 15-20%, Life vest who was admitted for fluid overload/decompensated CHF. Cardiology and nephrology both consulted.   Nephrology recommended HD x 2 consecutive days for UF. SOB resolved. Nephrology discussed with HD clinic to extend HD to 4 hours and restrict oral fluids to 500-1000ml and daily weights. If pt continues to have volume control issues, he will be placed on HD 4 times weekly. Pt counseled on Nephrology recommendations and to keep a log of weights and bring to HD clinic. Home health arranged. The patient was seen and examined today and determined to be stable for discharge.         Goals of Care Treatment Preferences:  Code Status: Full Code          What is most important right now is to focus on improvement in condition but with  limits to invasive therapies.  Accordingly, we have decided that the best plan to meet the patient's goals includes continuing with treatment.         Consults:   Consults (From admission, onward)          Status Ordering Provider     Inpatient consult to Social Work  Once        Provider:  (Not yet assigned)    Completed SHANICE MEZA     Inpatient consult to Cardiology  Once        Provider:  (Not yet assigned)    Completed SHANICE MEZA     Inpatient consult to Nephrology  Once        Provider:  (Not yet assigned)    Completed SHANICE MEZA     Inpatient consult to Registered Dietitian/Nutritionist  Once        Provider:  (Not yet assigned)    Completed SHANICE MEZA                Final Active Diagnoses:    Diagnosis Date Noted POA    PRINCIPAL PROBLEM:  Acute on chronic combined systolic and diastolic congestive heart failure [I50.43] 08/20/2024 Yes    Troponin level elevated [R79.89] 07/31/2024 Yes    Coronary artery disease involving coronary bypass graft of native heart [I25.810] 07/17/2024 Yes     Chronic    Paroxysmal atrial fibrillation [I48.0] 07/17/2024 Yes     Chronic    ESRD needing dialysis [N18.6, Z99.2] 02/12/2019 Yes    Type 2 diabetes mellitus with neurologic complication, without long-term current use of insulin [E11.49] 10/12/2013 Yes    Primary hypertension [I10] 10/12/2013 Yes      Problems Resolved During this Admission:       Discharged Condition: stable    Disposition: Home-Health Care [facility]    Follow Up:   Follow-up Information       No, Primary Doctor Follow up in 3 day(s).               Juan Greene MD Follow up in 2 week(s).    Specialty: Nephrology  Contact information:  [address] [address] [phone]               [facility] Follow up today.    Specialties: Home Health Services, Home Therapy Services, Home Living Aide Services  Why: home health  Contact information:  [address], [address]  [place] [facility] [place]  [address]  [phone]                         Patient Instructions:      ACCEPT - Ambulatory referral/consult to General Congestive Heart Failure Clinic   Standing Status: Future   Referral Priority: Routine Referral Type: Consultation   Referral Reason: Specialty Services Required   Requested Specialty: Cardiology   Number of Visits Requested: 1     Diet renal   Order Comments: Strict 1 liter fluid restriction, 2 gm sodium     Activity as tolerated       Significant Diagnostic Studies:   Imaging Results              X-Ray Chest AP Portable (Final result)  Result time 08/20/24 14:27:50      Final result by Jameel Jose MD (08/20/24 14:27:50)                   Impression:      Stable x-ray.      Electronically signed by: Jameel Jose MD  Date:    08/20/2024  Time:    14:27               Narrative:    EXAMINATION:  XR CHEST AP PORTABLE    CLINICAL HISTORY:  Respiratory distress., SOB;    COMPARISON:  08/15/2024 x-ray    FINDINGS:  External life vest defibrillator is present.    Sternal wires.  Left axillary surgical clips.    The cardiac size is enlarged.    Small right pleural effusion is present.    Persistent left pleural effusion, probably loculated.                                       Pending Diagnostic Studies:       None           Medications:  Reconciled Home Medications:      Medication List        START taking these medications      lactulose 20 gram/30 mL Soln  Commonly known as: CHRONULAC  Take 23 mLs (15 g total) by mouth every 6 (six) hours as needed.  Replaces: lactulose 20 gram Pack            CHANGE how you take these medications      amLODIPine 5 MG tablet  Commonly known as: NORVASC  Take 1 tablet (5 mg total) by mouth once daily.  What changed:   medication strength  how much to take            CONTINUE taking these medications      amiodarone 200 MG Tab  Commonly known as: PACERONE  Take 2 tablets (400 mg total) by mouth once daily.     aspirin 81 MG Chew  1 tablet     atorvastatin  40 MG tablet  Commonly known as: LIPITOR  TAKE 1 TABLET(40 MG) BY MOUTH EVERY DAY     clopidogreL 75 mg tablet  Commonly known as: PLAVIX  Take 1 tablet (75 mg total) by mouth once daily.     ELIQUIS 2.5 mg Tab  Generic drug: apixaban  TAKE 1 TABLET BY MOUTH TWICE DAILY     ENTRESTO 24-26 mg per tablet  Generic drug: sacubitriL-valsartan  Take 1 tablet by mouth 2 (two) times daily.     metoprolol tartrate 25 MG tablet  Commonly known as: LOPRESSOR  Take 0.5 tablets (12.5 mg total) by mouth 2 (two) times daily.     pantoprazole 40 MG tablet  Commonly known as: PROTONIX  TAKE 1 TABLET(40 MG) BY MOUTH EVERY DAY     QUEtiapine 25 MG Tab  Commonly known as: SEROQUEL  Take 25 mg by mouth every evening.     sevelamer carbonate 800 mg Tab  Commonly known as: RENVELA  Take 1 tablet (800 mg total) by mouth 3 (three) times daily with meals.            STOP taking these medications      lactulose 20 gram Pack  Commonly known as: CEPHULAC  Replaced by: lactulose 20 gram/30 mL Soln              Indwelling Lines/Drains at time of discharge:   Lines/Drains/Airways       Drain  Duration                  Hemodialysis AV Fistula Left upper arm -- days                    Time spent on the discharge of patient: 45 minutes         Phyllis Badillo NP  Department of Hospital Medicine  [facility] - Med Surg

## 2024-08-22 NOTE — ASSESSMENT & PLAN NOTE
Patient is identified as having Systolic (HFrEF) heart failure that is Acute on chronic. CHF is currently uncontrolled due to Continued edema of extremities. Latest ECHO performed and demonstrates- Results for orders placed during the hospital encounter of 07/16/24    Echo    Interpretation Summary    Left Ventricle: Regional wall motion abnormalities present. Septal motion is consistent with post-operative status. There is severely reduced systolic function with a visually estimated ejection fraction of 15 - 20%. Ejection fraction by visual approximation is 20%.    There is an impella that is adequately placed.  . Continue Beta Blocker, Furosemide, and ARNI and monitor clinical status closely. Monitor on telemetry. Patient is on CHF pathway.  Monitor strict Is&Os and daily weights.  Place on fluid restriction of 1.5 L. Cardiology has been consulted. Continue to stress to patient importance of self efficacy and  on diet for CHF. Last BNP reviewed- and noted below   Recent Labs   Lab 08/20/24  1332   BNP >4,900*         CHFrEF fluid overloaded  Continue toprolXL entrestor  HD per nephrology  Fluid restriction 34 oz  Continue amio 400 mg daily and Lifevest due to recent h/o aborted cardiac arrest    8/21/24  -Improving, seen during HD today  -Continue OMT    8/22/24  -Much improved  -Continue OMT  -HD schedule as per nephrology

## 2024-08-22 NOTE — PLAN OF CARE
08/22/24 0924   Post-Acute Status   Post-Acute Authorization Home Health   Home Health Status Referrals Sent   Coverage [place] Managed Medicare   Discharge Delays None known at this time   Discharge Plan   Discharge Plan A Home Health       Referral and orders sent to [facility].  Patient recently discharged from [facility], where [facility] was set up.

## 2024-08-22 NOTE — CONSULTS
"                    Food & Nutrition Education    Diet Education: Heart Failure  Time Spent: 15 minutes.    Learners: Pt    Nutrition Education provided with handouts:  Healthy-Heart Nutrition Therapy, Fluid-Restricted Diet, Low-Sodium Nutrition Therapy, Potassium Content of Foods, Phosphorus Content of Foods, "Carbohydrate Counting for People with Diabetes" and "Diabetes Carb list (nutritioncaremanual.org)    Patient Active Problem List   Diagnosis    Acute renal failure    Primary hypertension    Type 2 diabetes mellitus with neurologic complication, without long-term current use of insulin    Persistent proteinuria associated with type 2 diabetes mellitus    Edema    Hypertension associated with diabetes    Chronic kidney disease, stage III (moderate)    Nephrotic syndrome due to diabetes mellitus    ESRD needing dialysis    Chronic foot ulcer    Stable proliferative diabetic retinopathy of both eyes associated with type 2 diabetes mellitus    Cardiac arrest    Coronary artery disease involving coronary bypass graft of native heart    Ventricular tachycardia    On mechanically assisted ventilation    Encephalopathy acute    NSTEMI (non-ST elevated myocardial infarction)    Paroxysmal atrial fibrillation    Right lower lobe pneumonia    Other specified anemias    Subclavian artery stenosis    Troponin level elevated    Bradycardia    Status post left heart catheterization    PVD (peripheral vascular disease)    Acute on chronic combined systolic and diastolic congestive heart failure     Past Medical History:   Diagnosis Date    Chronic kidney disease     Diabetes mellitus     Diabetes mellitus, type 2     Hyperlipidemia     Hypertension     Other specified anemias 07/24/2024    PVD (peripheral vascular disease) 08/15/2024     Nutrition Related Social Determinants of Health: SDOH: Adequate food in home environment    Comments:  64 y.o male admitted with active principal problem of Acute on chronic combined " "systolic and diastolic congestive heart failure. RD consulted to provide heart failure nutrition education. Visited pt at bedside. Pt presents with little understanding of fluid restriction and no prior knowledged of appropriate meal planning for diabetes and ESRD on HD.    Educated patient on low sodium diet and fluid restriction related to hospital diagnosis. Discussed the importance of limiting sodium to 2,000 mg per day and reading food labels to avoid further complications of CHF. Discussed using salt free seasonings (. Tim and Raúl Chachere "No Salt") and other herbs and spices in meals to enhance flavor without additional sodium. Discussed 1500 ml fluid restriction per MD and dietary sources of fluid. Dietitian recommended using a cup with measurements for fluids and to try to consume small sips spread throughout the day rather than a lot at one time.    Educated patient on dietary sources of carbohydrates, carb counting and daily consistent carbohydrate intake. Discussed the importance of carbohydrates in the diet, but with diabetes, focusing on consistent carb intake throughout the day with emphasis on protein and fiber. For a 2,000-calorie diet, aim for 225-275g (45-55%) daily CHO (goal: 3-4 servings (45-60 g) of carbs per meal with snacks in between).    Educated pt on nutritional labs to monitor during doctor visits and the necessary adjustments to make if labs are abnormal. Discussed various food sources of K+ and Phos and the appropriate portion for low consumption (<200mg). Discussed reading food packages, food labels, and nutrition facts labels to identify K+/Phos nutrient content of foods. Discussed appropriate amounts of protein during a meal. Suggested to use the palm of her hand as a guide for the amount she should consume during a meal. Explained the importance of not consuming excessive amounts of proteins as his kidney will struggle with removing harmful toxics broken down from " excessively consumed proteins.    The pt remained engaged throughout entire nutrition education. He states he understands the information presented and will begin accounting for all nutrient content within meals consumed throughout the day. The pt states he has no additional questions currently and will reach out through provided contacts if she does. The pt states he has a good appetite with PO intake of %. Dietitian will continue to follow pt and monitor nutritional status during current admission.     Wt Readings from Last 20 Encounters:   08/20/24 78.1 kg (172 lb 2.9 oz)   07/31/24 76.8 kg (169 lb 5 oz)   12/22/20 121.1 kg (267 lb)   07/17/20 77.5 kg (170 lb 12.8 oz)   02/20/20 79.8 kg (176 lb)   11/07/19 78.9 kg (174 lb)   04/02/19 86 kg (189 lb 9.6 oz)   02/12/19 86.2 kg (190 lb)   03/27/14 75.3 kg (166 lb)   10/29/13 81.6 kg (180 lb)   10/25/13 75.3 kg (166 lb)   10/14/13 75.4 kg (166 lb 3.2 oz)   ]  NFPE not performed, pt appears well nourished.  All questions and concerns answered.  Provided handout with dietitian's contact information.  Please re-consult as needed.    Thank You!   Ricci Lopez MS, Registration Eligible, Provisional LDN

## 2024-08-22 NOTE — PLAN OF CARE
Problem: Adult Inpatient Plan of Care  Goal: Plan of Care Review  Outcome: Met  Goal: Patient-Specific Goal (Individualized)  Outcome: Met  Goal: Absence of Hospital-Acquired Illness or Injury  Outcome: Met  Goal: Optimal Comfort and Wellbeing  Outcome: Met  Goal: Readiness for Transition of Care  Outcome: Met     Problem: Hemodialysis  Goal: Safe, Effective Therapy Delivery  Outcome: Met  Goal: Effective Tissue Perfusion  Outcome: Met  Goal: Absence of Infection Signs and Symptoms  Outcome: Met     Problem: Diabetes Comorbidity  Goal: Blood Glucose Level Within Targeted Range  Outcome: Met     Problem: Acute Kidney Injury/Impairment  Goal: Fluid and Electrolyte Balance  Outcome: Met  Goal: Improved Oral Intake  Outcome: Met  Goal: Effective Renal Function  Outcome: Met     Problem: Pneumonia  Goal: Fluid Balance  Outcome: Met  Goal: Resolution of Infection Signs and Symptoms  Outcome: Met  Goal: Effective Oxygenation and Ventilation  Outcome: Met

## 2024-08-22 NOTE — SUBJECTIVE & OBJECTIVE
Review of Systems   Constitutional: Negative.   HENT: Negative.     Eyes: Negative.    Cardiovascular: Negative.    Respiratory: Negative.     Endocrine: Negative.    Hematologic/Lymphatic: Negative.    Skin: Negative.    Musculoskeletal: Negative.    Gastrointestinal: Negative.    Genitourinary: Negative.    Neurological: Negative.    Psychiatric/Behavioral: Negative.     Allergic/Immunologic: Negative.      Objective:     Vital Signs (Most Recent):  Temp: 98.5 °F (36.9 °C) (08/22/24 1149)  Pulse: 61 (08/22/24 1149)  Resp: 18 (08/22/24 1149)  BP: (!) 123/57 (08/22/24 1149)  SpO2: 99 % (08/22/24 1149) Vital Signs (24h Range):  Temp:  [98 °F (36.7 °C)-99.1 °F (37.3 °C)] 98.5 °F (36.9 °C)  Pulse:  [61-89] 61  Resp:  [18] 18  SpO2:  [94 %-99 %] 99 %  BP: ()/(50-72) 123/57     Weight: 78.1 kg (172 lb 2.9 oz)  Body mass index is 26.97 kg/m².     SpO2: 99 %         Intake/Output Summary (Last 24 hours) at 8/22/2024 1242  Last data filed at 8/21/2024 1500  Gross per 24 hour   Intake 500 ml   Output 3600 ml   Net -3100 ml       Lines/Drains/Airways       Peripheral Intravenous Line  Duration                  Hemodialysis AV Fistula Left upper arm -- days         Peripheral IV - Single Lumen 08/20/24 1628 20 G Anterior;Proximal;Right Forearm 1 day                       Physical Exam  Vitals and nursing note reviewed.   Constitutional:       General: He is not in acute distress.     Appearance: Normal appearance. He is well-developed. He is not diaphoretic.   HENT:      Head: Normocephalic and atraumatic.   Eyes:      General:         Right eye: No discharge.         Left eye: No discharge.      Pupils: Pupils are equal, round, and reactive to light.   Cardiovascular:      Rate and Rhythm: Normal rate and regular rhythm.      Heart sounds: Normal heart sounds, S1 normal and S2 normal. No murmur heard.     No S4 sounds.      Comments: Wearing LifeVest  Pulmonary:      Effort: Pulmonary effort is normal. No respiratory  distress.      Breath sounds: Normal breath sounds.   Abdominal:      General: There is no distension.   Musculoskeletal:      Right lower leg: No edema.      Left lower leg: No edema.   Skin:     General: Skin is warm and dry.      Findings: No erythema.   Neurological:      Mental Status: He is alert and oriented to person, place, and time.   Psychiatric:         Mood and Affect: Mood normal.         Behavior: Behavior normal.            Significant Labs: CMP   Recent Labs   Lab 08/20/24  1332 08/21/24  0627 08/22/24  0532    135* 142  142   K 4.2 4.0 4.6  4.6   CL 98 106 106  106   CO2 23 22* 26  26   * 89 77  77   BUN 26* 11 12  12   CREATININE 4.9* 3.0* 3.2*  3.2*   CALCIUM 10.3 9.1 9.2  9.2   PROT 7.6  --  5.5*   ALBUMIN 3.5 2.6* 2.7*   BILITOT 0.7  --  0.5   ALKPHOS 109  --  93   AST 18  --  17   ALT 17  --  22   ANIONGAP 17* 7* 10  10   , CBC   Recent Labs   Lab 08/20/24  1332 08/21/24  0627 08/22/24  0532   WBC 5.40 4.89 4.98   HGB 10.7* 8.9* 8.9*   HCT 33.4* 27.9* 28.2*    223 254   , Troponin   Recent Labs   Lab 08/20/24  1332   TROPONINI 0.079*   , and All pertinent lab results from the last 24 hours have been reviewed.    Significant Imaging: Echocardiogram: Transthoracic echo (TTE) complete (Cupid Only):   Results for orders placed or performed during the hospital encounter of 07/16/24   Echo   Result Value Ref Range    BSA 1.93 m2    EF 20 %    Narrative      Left Ventricle: Regional wall motion abnormalities present. Septal   motion is consistent with post-operative status. There is severely reduced   systolic function with a visually estimated ejection fraction of 15 - 20%.   Ejection fraction by visual approximation is 20%.    There is an impella that is adequately placed.      and EKG: Reviewed

## 2024-08-22 NOTE — DISCHARGE INSTRUCTIONS
Return to regularly scheduled HD tomorrow 8/23/24  Strict Fluid restitution of 500ml to 1000ml  Daily weights- keep a log and bring to HD clinic   2gm sodium or less diet

## 2024-08-23 ENCOUNTER — TELEPHONE (OUTPATIENT)
Dept: CARDIOLOGY | Facility: HOSPITAL | Age: 64
End: 2024-08-23
Payer: MEDICARE

## 2024-08-25 LAB
OHS QRS DURATION: 102 MS
OHS QTC CALCULATION: 461 MS

## 2024-08-31 NOTE — PHYSICIAN QUERY
Provider, due to conflicting documentation please clarify the type of heart failure.   Acute on Chronic Systolic Heart Failure (HFrEF or HFmrEF)

## 2024-09-23 ENCOUNTER — EXTERNAL HOME HEALTH (OUTPATIENT)
Dept: HOME HEALTH SERVICES | Facility: HOSPITAL | Age: 64
End: 2024-09-23
Payer: MEDICARE

## 2024-09-25 ENCOUNTER — DOCUMENT SCAN (OUTPATIENT)
Dept: HOME HEALTH SERVICES | Facility: HOSPITAL | Age: 64
End: 2024-09-25
Payer: MEDICARE

## 2024-09-25 ENCOUNTER — DOCUMENT SCAN (OUTPATIENT)
Dept: HOME HEALTH SERVICES | Facility: HOSPITAL | Age: 64
End: 2024-09-25

## 2024-10-08 ENCOUNTER — DOCUMENT SCAN (OUTPATIENT)
Dept: HOME HEALTH SERVICES | Facility: HOSPITAL | Age: 64
End: 2024-10-08
Payer: MEDICARE

## 2024-10-21 PROBLEM — J18.9 RIGHT LOWER LOBE PNEUMONIA: Status: RESOLVED | Noted: 2024-07-17 | Resolved: 2024-10-21

## 2024-10-21 PROBLEM — I21.4 NSTEMI (NON-ST ELEVATED MYOCARDIAL INFARCTION): Status: RESOLVED | Noted: 2024-07-17 | Resolved: 2024-10-21

## 2024-10-23 ENCOUNTER — DOCUMENT SCAN (OUTPATIENT)
Dept: HOME HEALTH SERVICES | Facility: HOSPITAL | Age: 64
End: 2024-10-23
Payer: MEDICARE

## 2024-10-30 ENCOUNTER — HOSPITAL ENCOUNTER (EMERGENCY)
Facility: HOSPITAL | Age: 64
Discharge: HOME | End: 2024-10-30
Attending: EMERGENCY MEDICINE
Payer: MEDICARE

## 2024-10-30 VITALS
TEMPERATURE: 99 F | SYSTOLIC BLOOD PRESSURE: 158 MMHG | RESPIRATION RATE: 16 BRPM | OXYGEN SATURATION: 96 % | HEART RATE: 78 BPM | DIASTOLIC BLOOD PRESSURE: 76 MMHG

## 2024-10-30 DIAGNOSIS — R11.2 NAUSEA & VOMITING: Primary | ICD-10-CM

## 2024-10-30 LAB
ALBUMIN SERPL BCP-MCNC: 3.7 G/DL (ref 3.5–5.2)
ALP SERPL-CCNC: 78 U/L (ref 40–150)
ALT SERPL W/O P-5'-P-CCNC: 77 U/L (ref 10–44)
ANION GAP SERPL CALC-SCNC: 18 MMOL/L (ref 8–16)
AST SERPL-CCNC: 45 U/L (ref 10–40)
BASOPHILS # BLD AUTO: 0.03 K/UL (ref 0–0.2)
BASOPHILS NFR BLD: 0.7 % (ref 0–1.9)
BILIRUB SERPL-MCNC: 0.6 MG/DL (ref 0.1–1)
BUN SERPL-MCNC: 31 MG/DL (ref 8–23)
CALCIUM SERPL-MCNC: 9.3 MG/DL (ref 8.7–10.5)
CHLORIDE SERPL-SCNC: 96 MMOL/L (ref 95–110)
CO2 SERPL-SCNC: 26 MMOL/L (ref 23–29)
CREAT SERPL-MCNC: 5.2 MG/DL (ref 0.5–1.4)
DIFFERENTIAL METHOD BLD: ABNORMAL
EOSINOPHIL # BLD AUTO: 0.1 K/UL (ref 0–0.5)
EOSINOPHIL NFR BLD: 2.9 % (ref 0–8)
ERYTHROCYTE [DISTWIDTH] IN BLOOD BY AUTOMATED COUNT: 15.3 % (ref 11.5–14.5)
EST. GFR  (NO RACE VARIABLE): 11 ML/MIN/1.73 M^2
GLUCOSE SERPL-MCNC: 111 MG/DL (ref 70–110)
HCT VFR BLD AUTO: 28.1 % (ref 40–54)
HGB BLD-MCNC: 9.1 G/DL (ref 14–18)
IMM GRANULOCYTES # BLD AUTO: 0.01 K/UL (ref 0–0.04)
IMM GRANULOCYTES NFR BLD AUTO: 0.2 % (ref 0–0.5)
LIPASE SERPL-CCNC: 12 U/L (ref 4–60)
LYMPHOCYTES # BLD AUTO: 0.7 K/UL (ref 1–4.8)
LYMPHOCYTES NFR BLD: 14.6 % (ref 18–48)
MCH RBC QN AUTO: 31.5 PG (ref 27–31)
MCHC RBC AUTO-ENTMCNC: 32.4 G/DL (ref 32–36)
MCV RBC AUTO: 97 FL (ref 82–98)
MONOCYTES # BLD AUTO: 0.4 K/UL (ref 0.3–1)
MONOCYTES NFR BLD: 9 % (ref 4–15)
NEUTROPHILS # BLD AUTO: 3.2 K/UL (ref 1.8–7.7)
NEUTROPHILS NFR BLD: 72.6 % (ref 38–73)
NRBC BLD-RTO: 0 /100 WBC
PLATELET # BLD AUTO: 258 K/UL (ref 150–450)
PMV BLD AUTO: 9.1 FL (ref 9.2–12.9)
POTASSIUM SERPL-SCNC: 3.9 MMOL/L (ref 3.5–5.1)
PROT SERPL-MCNC: 7.5 G/DL (ref 6–8.4)
RBC # BLD AUTO: 2.89 M/UL (ref 4.6–6.2)
SODIUM SERPL-SCNC: 140 MMOL/L (ref 136–145)
TROPONIN I SERPL DL<=0.01 NG/ML-MCNC: 0.06 NG/ML (ref 0–0.03)
TROPONIN I SERPL DL<=0.01 NG/ML-MCNC: 0.07 NG/ML (ref 0–0.03)
WBC # BLD AUTO: 4.46 K/UL (ref 3.9–12.7)

## 2024-10-30 PROCEDURE — 99285 EMERGENCY DEPT VISIT HI MDM: CPT | Mod: 25

## 2024-10-30 PROCEDURE — 93005 ELECTROCARDIOGRAM TRACING: CPT

## 2024-10-30 PROCEDURE — 80053 COMPREHEN METABOLIC PANEL: CPT | Performed by: EMERGENCY MEDICINE

## 2024-10-30 PROCEDURE — 63600175 PHARM REV CODE 636 W HCPCS: Performed by: EMERGENCY MEDICINE

## 2024-10-30 PROCEDURE — 96375 TX/PRO/DX INJ NEW DRUG ADDON: CPT

## 2024-10-30 PROCEDURE — 93010 ELECTROCARDIOGRAM REPORT: CPT | Mod: ,,, | Performed by: INTERNAL MEDICINE

## 2024-10-30 PROCEDURE — 84484 ASSAY OF TROPONIN QUANT: CPT | Performed by: EMERGENCY MEDICINE

## 2024-10-30 PROCEDURE — 83690 ASSAY OF LIPASE: CPT | Performed by: EMERGENCY MEDICINE

## 2024-10-30 PROCEDURE — 84484 ASSAY OF TROPONIN QUANT: CPT | Mod: 91 | Performed by: EMERGENCY MEDICINE

## 2024-10-30 PROCEDURE — 96365 THER/PROPH/DIAG IV INF INIT: CPT

## 2024-10-30 PROCEDURE — 85025 COMPLETE CBC W/AUTO DIFF WBC: CPT | Performed by: EMERGENCY MEDICINE

## 2024-10-30 PROCEDURE — 25000003 PHARM REV CODE 250: Performed by: EMERGENCY MEDICINE

## 2024-10-30 RX ORDER — PROCHLORPERAZINE EDISYLATE 5 MG/ML
10 INJECTION INTRAMUSCULAR; INTRAVENOUS ONCE
Status: COMPLETED | OUTPATIENT
Start: 2024-10-30 | End: 2024-10-30

## 2024-10-30 RX ORDER — PROCHLORPERAZINE MALEATE 10 MG
10 TABLET ORAL EVERY 8 HOURS PRN
Qty: 15 TABLET | Refills: 0 | OUTPATIENT
Start: 2024-10-30 | End: 2024-11-06

## 2024-10-30 RX ORDER — ONDANSETRON HYDROCHLORIDE 2 MG/ML
8 INJECTION, SOLUTION INTRAVENOUS
Status: COMPLETED | OUTPATIENT
Start: 2024-10-30 | End: 2024-10-30

## 2024-10-30 RX ADMIN — PROCHLORPERAZINE EDISYLATE 10 MG: 5 INJECTION INTRAMUSCULAR; INTRAVENOUS at 09:10

## 2024-10-30 RX ADMIN — ONDANSETRON 8 MG: 2 INJECTION INTRAMUSCULAR; INTRAVENOUS at 07:10

## 2024-10-30 RX ADMIN — PROMETHAZINE HYDROCHLORIDE 12.5 MG: 25 INJECTION INTRAMUSCULAR; INTRAVENOUS at 05:10

## 2024-10-30 NOTE — ED PROVIDER NOTES
SCRIBE #1 NOTE: I, Tristan Rossy, am scribing for, and in the presence of, Ariel Power Jr., MD. I have scribed the HPI, ROS, and PEx.     SCRIBE #2 NOTE: I, Kayla Carvajal, am scribing for, and in the presence of,  Tyrone Garcia DO. I have scribed the remaining portions of the note not scribed by Scribe #1.      History     Chief Complaint   Patient presents with    Nausea     Pt. Presents to ED  via [facility] due to being nauseated after dialysis today. Pt states he had same problem after dialysis on Monday took 8mg of zofran piror to EMS arrival       Review of patient's allergies indicates:   Allergen Reactions    Adhesive tape-silicones Rash         History of Present Illness     HPI    10/30/2024, 5:08 PM  History obtained from the patient      History of Present Illness: Silvino Robles is a 64 y.o. male patient with a PMHx of DMII, PVD, HLD, HTN, and chronic kidney disease who presents to the Emergency Department for evaluation of nausea and vomiting which onset gradually today during dialysis. Symptoms are intermittent and moderate in severity. No mitigating or exacerbating factors reported. No associated sxs reported. Patient denies any fever, abdominal pain, chills, diarrhea, and all other sxs at this time. Prior Tx includes 8 mg Zofran PTA. No further complaints or concerns at this time.       Arrival mode: [facility]    PCP: No, Primary Doctor        Past Medical History:  Past Medical History:   Diagnosis Date    Chronic kidney disease     Diabetes mellitus     Diabetes mellitus, type 2     Hyperlipidemia     Hypertension     Other specified anemias 07/24/2024    PVD (peripheral vascular disease) 08/15/2024       Past Surgical History:  Past Surgical History:   Procedure Laterality Date    ANGIOGRAM, ABDOMINAL AORTA W/ EXTREMITY RUNOFF N/A 7/23/2024    Procedure: Angiogram, Abdominal Aorta W/ Extremity Runoff;  Surgeon: Solis Carr MD;  Location: [facility] CATH LAB;  Service: Cardiology;   Laterality: N/A;    ANGIOGRAM, CORONARY, WITH LEFT HEART CATHETERIZATION N/A 7/22/2024    Procedure: Angiogram, Coronary, with Left Heart Cath;  Surgeon: Solis Carr MD;  Location: [facility] CATH LAB;  Service: Cardiology;  Laterality: N/A;    ANGIOGRAM, LOWER ARTERIAL, BILATERAL  7/22/2024    Procedure: Angiogram, Lower Arterial, Bilateral;  Surgeon: Solis Carr MD;  Location: [facility] CATH LAB;  Service: Cardiology;;    ANGIOGRAM, LOWER ARTERIAL, UNILATERAL  7/23/2024    Procedure: Angiogram, Lower Arterial, Unilateral;  Surgeon: Solis Carr MD;  Location: [facility] CATH LAB;  Service: Cardiology;;    ARTERIOGRAPHY OF SUBCLAVIAN ARTERY  7/22/2024    Procedure: ARTERIOGRAM, SUBCLAVIAN;  Surgeon: Solis Carr MD;  Location: [facility] CATH LAB;  Service: Cardiology;;    ARTERIOGRAPHY OF SUBCLAVIAN ARTERY Left 7/23/2024    Procedure: ARTERIOGRAM, SUBCLAVIAN;  Surgeon: Solis Carr MD;  Location: [facility] CATH LAB;  Service: Cardiology;  Laterality: Left;    CORONARY BYPASS GRAFT ANGIOGRAPHY  7/22/2024    Procedure: Bypass graft study;  Surgeon: Solis Carr MD;  Location: [facility] CATH LAB;  Service: Cardiology;;    EYE SURGERY      INSERTION OF INTRAVASCULAR MICROAXIAL BLOOD PUMP N/A 7/22/2024    Procedure: INSERTION, IMPELLA;  Surgeon: Solis Carr MD;  Location: [facility] CATH LAB;  Service: Cardiology;  Laterality: N/A;    INTRAVASCULAR ULTRASOUND, NON-CORONARY  7/23/2024    Procedure: Intravascular Ultrasound, Non-Coronary;  Surgeon: Solis Carr MD;  Location: [facility] CATH LAB;  Service: Cardiology;;    PTA, SUBCLAVIAN  7/23/2024    Procedure: PTA, Subclavian;  Surgeon: Solis Carr MD;  Location: [facility] CATH LAB;  Service: Cardiology;;    REVASCULARIZATION, ENDOVASCULAR, LE W/ INTRAVASCULAR LITHOTRIPSY AND STENT PLACEMENT Left 7/23/2024    Procedure: REVASCULARIZATION, ENDOVASCULAR, LE W/ INTRAVASCULAR LITHOTRIPSY AND STENT PLACEMENT;  Surgeon: Solis Carr MD;  Location: [facility] CATH LAB;  Service:  Cardiology;  Laterality: Left;  L subclavian artery    RIGHT HEART CATHETERIZATION Right 7/23/2024    Procedure: INSERTION, CATHETER, RIGHT HEART;  Surgeon: Solis Carr MD;  Location: [facility] CATH LAB;  Service: Cardiology;  Laterality: Right;    STENT, SUBCLAVIAN Left 7/23/2024    Procedure: Stent, Subclavian;  Surgeon: Solis Carr MD;  Location: [facility] CATH LAB;  Service: Cardiology;  Laterality: Left;         Family History:  Family History   Problem Relation Name Age of Onset    Diabetes Father      Diabetes Sister      Diabetes Brother         Social History:  Social History     Tobacco Use    Smoking status: Never    Smokeless tobacco: Never   Substance and Sexual Activity    Alcohol use: No    Drug use: No    Sexual activity: Never        Review of Systems     Review of Systems   Constitutional:  Negative for chills and fever.   HENT:  Negative for sore throat.    Respiratory:  Negative for shortness of breath.    Cardiovascular:  Negative for chest pain.   Gastrointestinal:  Positive for nausea and vomiting. Negative for abdominal pain and diarrhea.   Genitourinary:  Negative for dysuria.   Musculoskeletal:  Negative for back pain.   Skin:  Negative for rash.   Neurological:  Negative for weakness.   Hematological:  Does not bruise/bleed easily.   All other systems reviewed and are negative.       Physical Exam     Initial Vitals [10/30/24 1653]   BP Pulse Resp Temp SpO2   (!) 142/77 77 18 98.2 °F (36.8 °C) 100 %      MAP       --          Physical Exam  Nursing Notes and Vital Signs Reviewed.  Constitutional: Patient is in no acute distress. Well-developed and well-nourished.  Head: Atraumatic. Normocephalic.  Eyes:  EOM intact.  No scleral icterus.  ENT: Mucous membranes are moist.  Nares clear   Neck:  Full ROM. No JVD.  Cardiovascular: Regular rate. Regular rhythm No murmurs, rubs, or gallops. Distal pulses are 2+ and symmetric  Pulmonary/Chest: No respiratory distress. Clear to auscultation  bilaterally. No wheezing or rales.  Equal chest wall rise bilaterally  Abdominal: Soft and non-distended.  There is no tenderness.  No rebound, guarding, or rigidity. Good bowel sounds.  Genitourinary: No CVA tenderness.  No suprapubic tenderness  Musculoskeletal: Moves all extremities. No obvious deformities.  5 x 5 strength in all extremities   Skin: Warm and dry.  Neurological:  Alert, awake, and appropriate.  Normal speech.  No acute focal neurological deficits are appreciated.  Two through 12 intact bilaterally.  Psychiatric: Normal affect. Good eye contact. Appropriate in content.       ED Course   Procedures  ED Vital Signs:  Vitals:    10/30/24 1653 10/30/24 1730 10/30/24 1800 10/30/24 1830   BP: (!) 142/77 (!) 150/72 (!) 153/71 123/60   Pulse: 77 75  74   Resp: 18 (!) 23     Temp: 98.2 °F (36.8 °C)      TempSrc: Oral      SpO2: 100% 97%  95%    10/30/24 1947 10/30/24 2000 10/30/24 2100 10/30/24 2130   BP: (!) 147/82 (!) 147/72  137/69   Pulse: 79  74 73   Resp: 20  17 18   Temp:       TempSrc:       SpO2: 99%  96% 95%    10/30/24 2245   BP: (!) 158/76   Pulse: 78   Resp: 16   Temp: 98.5 °F (36.9 °C)   TempSrc: Oral   SpO2: 96%       Abnormal Lab Results:  Labs Reviewed   CBC W/ AUTO DIFFERENTIAL - Abnormal       Result Value    WBC 4.46      RBC 2.89 (*)     Hemoglobin 9.1 (*)     Hematocrit 28.1 (*)     MCV 97      MCH 31.5 (*)     MCHC 32.4      RDW 15.3 (*)     Platelets 258      MPV 9.1 (*)     Immature Granulocytes 0.2      Gran # (ANC) 3.2      Immature Grans (Abs) 0.01      Lymph # 0.7 (*)     Mono # 0.4      Eos # 0.1      Baso # 0.03      nRBC 0      Gran % 72.6      Lymph % 14.6 (*)     Mono % 9.0      Eosinophil % 2.9      Basophil % 0.7      Differential Method Automated     COMPREHENSIVE METABOLIC PANEL - Abnormal    Sodium 140      Potassium 3.9      Chloride 96      CO2 26      Glucose 111 (*)     BUN 31 (*)     Creatinine 5.2 (*)     Calcium 9.3      Total Protein 7.5      Albumin 3.7       Total Bilirubin 0.6      Alkaline Phosphatase 78      AST 45 (*)     ALT 77 (*)     eGFR 11 (*)     Anion Gap 18 (*)    TROPONIN I - Abnormal    Troponin I 0.065 (*)    TROPONIN I - Abnormal    Troponin I 0.058 (*)    LIPASE    Lipase 12          All Lab Results:  Results for orders placed or performed during the hospital encounter of 10/30/24   EKG 12-lead    Collection Time: 10/30/24  5:31 PM   Result Value Ref Range    QRS Duration 124 ms    [facility] QTC Calculation 530 ms   CBC auto differential    Collection Time: 10/30/24  5:40 PM   Result Value Ref Range    WBC 4.46 3.90 - 12.70 K/uL    RBC 2.89 (L) 4.60 - 6.20 M/uL    Hemoglobin 9.1 (L) 14.0 - 18.0 g/dL    Hematocrit 28.1 (L) 40.0 - 54.0 %    MCV 97 82 - 98 fL    MCH 31.5 (H) 27.0 - 31.0 pg    MCHC 32.4 32.0 - 36.0 g/dL    RDW 15.3 (H) 11.5 - 14.5 %    Platelets 258 150 - 450 K/uL    MPV 9.1 (L) 9.2 - 12.9 fL    Immature Granulocytes 0.2 0.0 - 0.5 %    Gran # (ANC) 3.2 1.8 - 7.7 K/uL    Immature Grans (Abs) 0.01 0.00 - 0.04 K/uL    Lymph # 0.7 (L) 1.0 - 4.8 K/uL    Mono # 0.4 0.3 - 1.0 K/uL    Eos # 0.1 0.0 - 0.5 K/uL    Baso # 0.03 0.00 - 0.20 K/uL    nRBC 0 0 /100 WBC    Gran % 72.6 38.0 - 73.0 %    Lymph % 14.6 (L) 18.0 - 48.0 %    Mono % 9.0 4.0 - 15.0 %    Eosinophil % 2.9 0.0 - 8.0 %    Basophil % 0.7 0.0 - 1.9 %    Differential Method Automated    Comprehensive metabolic panel    Collection Time: 10/30/24  5:40 PM   Result Value Ref Range    Sodium 140 136 - 145 mmol/L    Potassium 3.9 3.5 - 5.1 mmol/L    Chloride 96 95 - 110 mmol/L    CO2 26 23 - 29 mmol/L    Glucose 111 (H) 70 - 110 mg/dL    BUN 31 (H) 8 - 23 mg/dL    Creatinine 5.2 (H) 0.5 - 1.4 mg/dL    Calcium 9.3 8.7 - 10.5 mg/dL    Total Protein 7.5 6.0 - 8.4 g/dL    Albumin 3.7 3.5 - 5.2 g/dL    Total Bilirubin 0.6 0.1 - 1.0 mg/dL    Alkaline Phosphatase 78 40 - 150 U/L    AST 45 (H) 10 - 40 U/L    ALT 77 (H) 10 - 44 U/L    eGFR 11 (A) >60 mL/min/1.73 m^2    Anion Gap 18 (H) 8 - 16 mmol/L   Lipase     Collection Time: 10/30/24  5:40 PM   Result Value Ref Range    Lipase 12 4 - 60 U/L   Troponin I    Collection Time: 10/30/24  5:40 PM   Result Value Ref Range    Troponin I 0.065 (H) 0.000 - 0.026 ng/mL   Troponin I    Collection Time: 10/30/24  9:27 PM   Result Value Ref Range    Troponin I 0.058 (H) 0.000 - 0.026 ng/mL         Imaging Results:  Imaging Results              CT Abdomen Pelvis  Without Contrast (Final result)  Result time 10/30/24 19:30:48      Final result by Rosana Lyn MD (10/30/24 19:30:48)                   Impression:       No overt acute finding unenhanced imaging with numerous other details above    All CT scans at [this location] are performed using dose modulation techniques as appropriate to a performed exam including the following: automated exposure control; adjustment of the mA and/or kV according to patient size (this includes techniques or standardized protocols for targeted exams where dose is matched to indication / reason for exam; i.e. extremities or head); use of iterative reconstruction technique.    Finalized on: 10/30/2024 7:30 PM By:  Rosana Lyn MD  [facility]# [id number]      2024-10-30 19:32:55.246    [facility]               Narrative:    EXAM: CT ABDOMEN PELVIS WITHOUT CONTRAST    CLINICAL INDICATION:  Abdominal abscess infection    TECHNIQUE: Axial and multiplanar 2-D reformations provided  unenhanced imaging  No prior comparison available    FINDINGS:  Imaging degraded by artifact.  Pleural effusions bilateral    Unenhanced liver borderline enlarged.  Gallbladder absent  Pancreas partially fatty atrophic  Spleen normal size  Kidneys without hydronephrosis, innumerable renal presumed cysts bilateral detail degraded by artifact  Extensive vascular calcification  Urinary bladder decompressed May be thick walled    No overt signs of appendicitis or obstructive bowel findings  Scant ascites                                         The EKG was ordered, reviewed, and  independently interpreted by the ED provider.  Interpretation time: 17:31  Rate: 74 BPM  Rhythm: normal sinus rhythm with premature ventricular complexes  Interpretation: Nonspecific intraventricular conduction delay. Marked ST abnormality, possible lateral subendocardial injury. No STEMI.             The Emergency Provider reviewed the vital signs and test results, which are outlined above.     ED Discussion     8:00 PM: Dr. Power transfers care of patient to Dr. Garcia pending labwork.     8:13 PM: Dr. Garcia agrees with Dr. Power's assessment and plan of care. Evaluated pt. Pt is resting comfortably and is in no acute distress.  D/w pt all pertinent results. D/w pt any concerns expressed at this time. Answered all questions. Pt expresses understanding at this time.    10:41 PM: Reassessed pt at this time. Discussed with pt all pertinent ED information and results. Discussed pt dx and plan of tx. Gave pt all f/u and return to the ED instructions. All questions and concerns were addressed at this time. Pt expresses understanding of information and instructions, and is comfortable with plan to discharge. Pt is stable for discharge.    I discussed with patient and/or family/caretaker that evaluation in the ED does not suggest any emergent or life threatening medical conditions requiring immediate intervention beyond what was provided in the ED, and I believe patient is safe for discharge.  Regardless, an unremarkable evaluation in the ED does not preclude the development or presence of a serious of life threatening condition. As such, patient was instructed to return immediately for any worsening or change in current symptoms.      ED Course as of 10/31/24 0139   Wed Oct 30, 2024   2237 Patient states his nausea has improved and is able to tolerate fluids. [CD]   2238 Troponin I(!)  Troponin times 2 readings shows chronic elevation [CD]   2239 Comprehensive metabolic panel(!)  Findings of end-stage renal disease with  other nonspecific findings [CD]   2239 Lipase  Within normal limits [CD]   2239 CBC auto differential(!)  Nonspecific findings [CD]   2239 CT Abdomen Pelvis  Without Contrast  Unenhanced liver borderline enlarged.  Gallbladder absent  Pancreas partially fatty atrophic  Spleen normal size  Kidneys without hydronephrosis, innumerable renal presumed cysts bilateral detail degraded by artifact  Extensive vascular calcification  Urinary bladder decompressed May be thick walled     No overt signs of appendicitis or obstructive bowel findings  Scant ascites   [CD]      ED Course User Index  [CD] Tyrone Garcia, DO     Medical Decision Making  Differential diagnosis: Abdominal pain, nausea and vomiting, electrolyte abnormality, hypotension, bowel obstruction    Instructed patient to return immediately for any new or worsening symptoms and he verbalized understanding.    Amount and/or Complexity of Data Reviewed  External Data Reviewed: labs.     Details: Baseline labs reviewed  Labs: ordered. Decision-making details documented in ED Course.     Details: Patient was troponin is a bit elevated 0.065 however this appears to be baseline for him in his actually lower than usual.  Patient was creatinine 5.2 and BUN of 31 in his electrolytes are fine.  Has a hemoglobin 9.1 with a normal white count.  Radiology: ordered. Decision-making details documented in ED Course.  ECG/medicine tests: ordered and independent interpretation performed. Decision-making details documented in ED Course.     Details: No STEMI    Risk  OTC drugs.  Prescription drug management.  Decision regarding hospitalization.  Diagnosis or treatment significantly limited by social determinants of health.  Risk Details: Social determinants: Patient was dialysis pain                ED Medication(s):  Medications   promethazine (PHENERGAN) 12.5 mg in 0.9% NaCl 50 mL IVPB (0 mg Intravenous Stopped 10/30/24 6505)   ondansetron injection 8 mg (8 mg Intravenous  Given 10/30/24 1945)   prochlorperazine injection Soln 10 mg (10 mg Intravenous Given 10/30/24 2126)       Discharge Medication List as of 10/30/2024 10:42 PM        START taking these medications    Details   prochlorperazine (COMPAZINE) 10 MG tablet Take 1 tablet (10 mg total) by mouth every 8 (eight) hours as needed (Nausea and vomiting)., Starting Wed 10/30/2024, Print              Follow-up Information       Schedule an appointment as soon as possible for a visit  to follow up.    Contact information:  Jameel Knowles MD   [address]   [facility]   [place] [address] [phone] (Work)                               Scribe Attestation:   Scribe #1: I performed the above scribed service and the documentation accurately describes the services I performed. I attest to the accuracy of the note.     Attending:   Physician Attestation Statement for Scribe #1: I, Ariel Power Jr., MD, personally performed the services described in this documentation, as scribed by Tristan Blair, in my presence, and it is both accurate and complete.       Scribe Attestation:   Scribe #2: I performed the above scribed service and the documentation accurately describes the services I performed. I attest to the accuracy of the note.    Attending Attestation:           Physician Attestation for Scribe:    Physician Attestation Statement for Scribe #2: I, Tyrone Garcia DO, reviewed documentation, as scribed by Kayla Carvajal in my presence, and it is both accurate and complete. I also acknowledge and confirm the content of the note done by Scribe #1.           Clinical Impression       ICD-10-CM ICD-9-CM   1. Nausea & vomiting  R11.2 787.01       Disposition:   Disposition: Discharged  Condition: Stable        Tyrone Garcia DO  10/31/24 0141

## 2024-11-02 LAB
OHS QRS DURATION: 124 MS
OHS QTC CALCULATION: 530 MS

## 2024-11-06 ENCOUNTER — EXTERNAL HOME HEALTH (OUTPATIENT)
Dept: HOME HEALTH SERVICES | Facility: HOSPITAL | Age: 64
End: 2024-11-06
Payer: MEDICARE

## 2024-11-06 ENCOUNTER — HOSPITAL ENCOUNTER (EMERGENCY)
Facility: HOSPITAL | Age: 64
Discharge: HOME | End: 2024-11-06
Attending: FAMILY MEDICINE
Payer: MEDICARE

## 2024-11-06 VITALS
OXYGEN SATURATION: 100 % | TEMPERATURE: 98 F | RESPIRATION RATE: 13 BRPM | WEIGHT: 153.88 LBS | HEART RATE: 67 BPM | BODY MASS INDEX: 24.1 KG/M2 | DIASTOLIC BLOOD PRESSURE: 77 MMHG | SYSTOLIC BLOOD PRESSURE: 161 MMHG

## 2024-11-06 DIAGNOSIS — N18.6 ESRD (END STAGE RENAL DISEASE) ON DIALYSIS: ICD-10-CM

## 2024-11-06 DIAGNOSIS — R53.1 WEAKNESS: Primary | ICD-10-CM

## 2024-11-06 DIAGNOSIS — Z99.2 ESRD (END STAGE RENAL DISEASE) ON DIALYSIS: ICD-10-CM

## 2024-11-06 DIAGNOSIS — R10.13 EPIGASTRIC PAIN: ICD-10-CM

## 2024-11-06 PROBLEM — R11.0 NAUSEA: Status: ACTIVE | Noted: 2024-11-06

## 2024-11-06 LAB
ALBUMIN SERPL BCP-MCNC: 3.4 G/DL (ref 3.5–5.2)
ALP SERPL-CCNC: 81 U/L (ref 40–150)
ALT SERPL W/O P-5'-P-CCNC: 207 U/L (ref 10–44)
ANION GAP SERPL CALC-SCNC: 19 MMOL/L (ref 8–16)
AST SERPL-CCNC: 38 U/L (ref 10–40)
BASOPHILS # BLD AUTO: 0.03 K/UL (ref 0–0.2)
BASOPHILS NFR BLD: 0.5 % (ref 0–1.9)
BILIRUB SERPL-MCNC: 0.5 MG/DL (ref 0.1–1)
BNP SERPL-MCNC: >4900 PG/ML (ref 0–99)
BUN SERPL-MCNC: 56 MG/DL (ref 8–23)
CALCIUM SERPL-MCNC: 8.5 MG/DL (ref 8.7–10.5)
CHLORIDE SERPL-SCNC: 97 MMOL/L (ref 95–110)
CO2 SERPL-SCNC: 23 MMOL/L (ref 23–29)
CREAT SERPL-MCNC: 8.4 MG/DL (ref 0.5–1.4)
DIFFERENTIAL METHOD BLD: ABNORMAL
EOSINOPHIL # BLD AUTO: 0.1 K/UL (ref 0–0.5)
EOSINOPHIL NFR BLD: 1.9 % (ref 0–8)
ERYTHROCYTE [DISTWIDTH] IN BLOOD BY AUTOMATED COUNT: 18 % (ref 11.5–14.5)
EST. GFR  (NO RACE VARIABLE): 6 ML/MIN/1.73 M^2
GLUCOSE SERPL-MCNC: 250 MG/DL (ref 70–110)
HCT VFR BLD AUTO: 34.4 % (ref 40–54)
HGB BLD-MCNC: 10.7 G/DL (ref 14–18)
IMM GRANULOCYTES # BLD AUTO: 0.03 K/UL (ref 0–0.04)
IMM GRANULOCYTES NFR BLD AUTO: 0.5 % (ref 0–0.5)
LIPASE SERPL-CCNC: 17 U/L (ref 4–60)
LYMPHOCYTES # BLD AUTO: 0.6 K/UL (ref 1–4.8)
LYMPHOCYTES NFR BLD: 10.5 % (ref 18–48)
MCH RBC QN AUTO: 32.3 PG (ref 27–31)
MCHC RBC AUTO-ENTMCNC: 31.1 G/DL (ref 32–36)
MCV RBC AUTO: 104 FL (ref 82–98)
MONOCYTES # BLD AUTO: 0.6 K/UL (ref 0.3–1)
MONOCYTES NFR BLD: 10.1 % (ref 4–15)
NEUTROPHILS # BLD AUTO: 4.5 K/UL (ref 1.8–7.7)
NEUTROPHILS NFR BLD: 76.5 % (ref 38–73)
NRBC BLD-RTO: 0 /100 WBC
PLATELET # BLD AUTO: 260 K/UL (ref 150–450)
PMV BLD AUTO: 9.8 FL (ref 9.2–12.9)
POTASSIUM SERPL-SCNC: 5.3 MMOL/L (ref 3.5–5.1)
PROT SERPL-MCNC: 6.9 G/DL (ref 6–8.4)
RBC # BLD AUTO: 3.31 M/UL (ref 4.6–6.2)
SODIUM SERPL-SCNC: 139 MMOL/L (ref 136–145)
TROPONIN I SERPL DL<=0.01 NG/ML-MCNC: 0.07 NG/ML (ref 0–0.03)
WBC # BLD AUTO: 5.93 K/UL (ref 3.9–12.7)

## 2024-11-06 PROCEDURE — 93010 ELECTROCARDIOGRAM REPORT: CPT | Mod: ,,, | Performed by: INTERNAL MEDICINE

## 2024-11-06 PROCEDURE — 63600175 PHARM REV CODE 636 W HCPCS: Performed by: FAMILY MEDICINE

## 2024-11-06 PROCEDURE — 84484 ASSAY OF TROPONIN QUANT: CPT | Performed by: FAMILY MEDICINE

## 2024-11-06 PROCEDURE — 25500020 PHARM REV CODE 255: Performed by: FAMILY MEDICINE

## 2024-11-06 PROCEDURE — 96374 THER/PROPH/DIAG INJ IV PUSH: CPT

## 2024-11-06 PROCEDURE — 80053 COMPREHEN METABOLIC PANEL: CPT | Performed by: FAMILY MEDICINE

## 2024-11-06 PROCEDURE — 83690 ASSAY OF LIPASE: CPT | Performed by: FAMILY MEDICINE

## 2024-11-06 PROCEDURE — 99284 EMERGENCY DEPT VISIT MOD MDM: CPT | Mod: ,,, | Performed by: INTERNAL MEDICINE

## 2024-11-06 PROCEDURE — 85025 COMPLETE CBC W/AUTO DIFF WBC: CPT | Performed by: FAMILY MEDICINE

## 2024-11-06 PROCEDURE — 83880 ASSAY OF NATRIURETIC PEPTIDE: CPT | Performed by: FAMILY MEDICINE

## 2024-11-06 PROCEDURE — 93005 ELECTROCARDIOGRAM TRACING: CPT

## 2024-11-06 PROCEDURE — 99285 EMERGENCY DEPT VISIT HI MDM: CPT | Mod: 25

## 2024-11-06 PROCEDURE — 96375 TX/PRO/DX INJ NEW DRUG ADDON: CPT

## 2024-11-06 PROCEDURE — 25000003 PHARM REV CODE 250: Performed by: FAMILY MEDICINE

## 2024-11-06 RX ORDER — MORPHINE SULFATE 4 MG/ML
4 INJECTION, SOLUTION INTRAMUSCULAR; INTRAVENOUS
Status: COMPLETED | OUTPATIENT
Start: 2024-11-06 | End: 2024-11-06

## 2024-11-06 RX ORDER — ONDANSETRON HYDROCHLORIDE 2 MG/ML
4 INJECTION, SOLUTION INTRAVENOUS ONCE
Status: COMPLETED | OUTPATIENT
Start: 2024-11-06 | End: 2024-11-06

## 2024-11-06 RX ORDER — ONDANSETRON 8 MG/1
8 TABLET, ORALLY DISINTEGRATING ORAL DAILY PRN
COMMUNITY
Start: 2024-10-24

## 2024-11-06 RX ORDER — ONDANSETRON 4 MG/1
4 TABLET, FILM COATED ORAL EVERY 6 HOURS
Qty: 12 TABLET | Refills: 0 | Status: SHIPPED | OUTPATIENT
Start: 2024-11-06

## 2024-11-06 RX ADMIN — IOHEXOL 100 ML: 350 INJECTION, SOLUTION INTRAVENOUS at 03:11

## 2024-11-06 RX ADMIN — MORPHINE SULFATE 4 MG: 4 INJECTION INTRAVENOUS at 01:11

## 2024-11-06 RX ADMIN — SODIUM ZIRCONIUM CYCLOSILICATE 10 G: 5 POWDER, FOR SUSPENSION ORAL at 04:11

## 2024-11-06 RX ADMIN — IOHEXOL 30 ML: 350 INJECTION, SOLUTION INTRAVENOUS at 03:11

## 2024-11-06 RX ADMIN — ONDANSETRON 4 MG: 2 INJECTION INTRAMUSCULAR; INTRAVENOUS at 01:11

## 2024-11-06 NOTE — PHARMACY MED REC
"Admission Medication History     The home medication history was taken by Shaheen Cardoza.    You may go to "Admission" then "Reconcile Home Medications" tabs to review and/or act upon these items.     The home medication list has been updated by the Pharmacy department.   Please read ALL comments highlighted in yellow.   Please address this information as you see fit.    Feel free to contact us if you have any questions or require assistance.      Medications listed below were obtained from: Patient/family, Medications brought from home, and Analytic software- [employer] ([employer]  (Not in a hospital admission)      Shaheen Cardoza  [id number]      Current Outpatient Medications on File Prior to Encounter   Medication Sig Dispense Refill Last Dose/Taking    amiodarone (PACERONE) 200 MG Tab Take 2 tablets (400 mg total) by mouth once daily. 60 tablet 1 Past Week    amLODIPine (NORVASC) 5 MG tablet Take 1 tablet (5 mg total) by mouth once daily. 30 tablet 1 Past Week    apixaban (ELIQUIS) 2.5 mg Tab Take 1 tablet (2.5 mg total) by mouth 2 (two) times daily. 60 tablet 1 Past Week    aspirin 81 MG Chew Take 1 tablet (81 mg total) by mouth once daily. 30 tablet 1 Past Week    atorvastatin (LIPITOR) 40 MG tablet Take 1 tablet (40 mg total) by mouth once daily. 30 tablet 1 Past Week    clopidogreL (PLAVIX) 75 mg tablet Take 1 tablet (75 mg total) by mouth once daily. 30 tablet 1 Past Week    metoprolol tartrate (LOPRESSOR) 25 MG tablet Take 0.5 tablets (12.5 mg total) by mouth 2 (two) times daily. 60 tablet 1 Past Week    ondansetron (ZOFRAN-ODT) 8 MG TbDL Take 8 mg by mouth daily as needed.   11/5/2024    pantoprazole (PROTONIX) 40 MG tablet Take 1 tablet (40 mg total) by mouth once daily. 30 tablet 1 Past Week    prochlorperazine (COMPAZINE) 10 MG tablet Take 1 tablet (10 mg total) by mouth every 8 (eight) hours as needed (Nausea and vomiting). 15 tablet 0 11/6/2024    QUEtiapine (SEROQUEL) 25 MG Tab Take 1 tablet (25 mg total) " by mouth every evening. 30 tablet 1 Past Week    sacubitriL-valsartan (ENTRESTO) 24-26 mg per tablet Take 1 tablet by mouth 2 (two) times daily. 60 tablet 1 Past Week    sevelamer carbonate (RENVELA) 800 mg Tab Take 1 tablet (800 mg total) by mouth 3 (three) times daily with meals. 270 tablet 1 Past Week                   .

## 2024-11-06 NOTE — ED PROVIDER NOTES
SCRIBE #1 NOTE: I, Tristan Blair, am scribing for, and in the presence of, Radha Winkler MD. I have scribed the entire note.       History     Chief Complaint   Patient presents with    Weakness     To the ed for further evaluation of weakness, n/v worsening since wed. +LVAD     Review of patient's allergies indicates:   Allergen Reactions    Adhesive tape-silicones Rash         History of Present Illness     HPI    11/6/2024, 12:19 PM  History obtained from the wife and patient      History of Present Illness: Silvino Robles is a 64 y.o. male patient with a PMHx of chronic kidney disease, DMII, HTN, HLD, anemias, PVD who presents to the Emergency Department for evaluation of generalized weakness which onset gradually 1 week ago. Pt has also been experiencing n/v for 1 month. Pt reports no disturbance in appetite. Pt reports Pt is currently on dialysis and has a life vest; pt has coded twice on dialysis. Pt's wife reports a previous infestation of fleas which she called the  for. Symptoms are constant and moderate in severity. Associated sxs include scalp itching. Patient denies any fever, diarrhea, dysuria, urinary retention, and all other sxs at this time. Prior Tx includes miki-seltzer, zofran. No further complaints or concerns at this time.       Arrival mode: Personal vehicle     PCP: Cristiana, Primary Doctor        Past Medical History:  Past Medical History:   Diagnosis Date    Chronic kidney disease     Diabetes mellitus     Diabetes mellitus, type 2     Hyperlipidemia     Hypertension     Other specified anemias 07/24/2024    PVD (peripheral vascular disease) 08/15/2024       Past Surgical History:  Past Surgical History:   Procedure Laterality Date    ANGIOGRAM, ABDOMINAL AORTA W/ EXTREMITY RUNOFF N/A 7/23/2024    Procedure: Angiogram, Abdominal Aorta W/ Extremity Runoff;  Surgeon: Solis Carr MD;  Location: [facility] CATH LAB;  Service: Cardiology;  Laterality: N/A;    ANGIOGRAM,  CORONARY, WITH LEFT HEART CATHETERIZATION N/A 7/22/2024    Procedure: Angiogram, Coronary, with Left Heart Cath;  Surgeon: Solis Carr MD;  Location: [facility] CATH LAB;  Service: Cardiology;  Laterality: N/A;    ANGIOGRAM, LOWER ARTERIAL, BILATERAL  7/22/2024    Procedure: Angiogram, Lower Arterial, Bilateral;  Surgeon: Solis Carr MD;  Location: [facility] CATH LAB;  Service: Cardiology;;    ANGIOGRAM, LOWER ARTERIAL, UNILATERAL  7/23/2024    Procedure: Angiogram, Lower Arterial, Unilateral;  Surgeon: Solis Carr MD;  Location: [facility] CATH LAB;  Service: Cardiology;;    ARTERIOGRAPHY OF SUBCLAVIAN ARTERY  7/22/2024    Procedure: ARTERIOGRAM, SUBCLAVIAN;  Surgeon: Solis Carr MD;  Location: [facility] CATH LAB;  Service: Cardiology;;    ARTERIOGRAPHY OF SUBCLAVIAN ARTERY Left 7/23/2024    Procedure: ARTERIOGRAM, SUBCLAVIAN;  Surgeon: Solis Carr MD;  Location: [facility] CATH LAB;  Service: Cardiology;  Laterality: Left;    CORONARY BYPASS GRAFT ANGIOGRAPHY  7/22/2024    Procedure: Bypass graft study;  Surgeon: Solis Carr MD;  Location: [facility] CATH LAB;  Service: Cardiology;;    EYE SURGERY      INSERTION OF INTRAVASCULAR MICROAXIAL BLOOD PUMP N/A 7/22/2024    Procedure: INSERTION, IMPELLA;  Surgeon: Solis Carr MD;  Location: [facility] CATH LAB;  Service: Cardiology;  Laterality: N/A;    INTRAVASCULAR ULTRASOUND, NON-CORONARY  7/23/2024    Procedure: Intravascular Ultrasound, Non-Coronary;  Surgeon: Solis Carr MD;  Location: [facility] CATH LAB;  Service: Cardiology;;    PTA, SUBCLAVIAN  7/23/2024    Procedure: PTA, Subclavian;  Surgeon: Solis Carr MD;  Location: [facility] CATH LAB;  Service: Cardiology;;    REVASCULARIZATION, ENDOVASCULAR, LE W/ INTRAVASCULAR LITHOTRIPSY AND STENT PLACEMENT Left 7/23/2024    Procedure: REVASCULARIZATION, ENDOVASCULAR, LE W/ INTRAVASCULAR LITHOTRIPSY AND STENT PLACEMENT;  Surgeon: Solis Carr MD;  Location: [facility] CATH LAB;  Service: Cardiology;  Laterality: Left;  L  subclavian artery    RIGHT HEART CATHETERIZATION Right 7/23/2024    Procedure: INSERTION, CATHETER, RIGHT HEART;  Surgeon: Solis Carr MD;  Location: [facility] CATH LAB;  Service: Cardiology;  Laterality: Right;    STENT, SUBCLAVIAN Left 7/23/2024    Procedure: Stent, Subclavian;  Surgeon: Solis Carr MD;  Location: [facility] CATH LAB;  Service: Cardiology;  Laterality: Left;         Family History:  Family History   Problem Relation Name Age of Onset    Diabetes Father      Diabetes Sister      Diabetes Brother         Social History:  Social History     Tobacco Use    Smoking status: Never    Smokeless tobacco: Never   Substance and Sexual Activity    Alcohol use: No    Drug use: No    Sexual activity: Never        Review of Systems     Review of Systems   Constitutional:  Negative for fever.   HENT:  Negative for sore throat.    Respiratory:  Negative for shortness of breath.    Cardiovascular:  Negative for chest pain.   Gastrointestinal:  Positive for nausea and vomiting. Negative for diarrhea.   Genitourinary:  Negative for difficulty urinating and dysuria.   Musculoskeletal:  Negative for back pain.   Skin:  Negative for rash.        (+) scalp itching   Neurological:  Positive for weakness (generalized).   Hematological:  Does not bruise/bleed easily.   All other systems reviewed and are negative.       Physical Exam     Initial Vitals [11/06/24 1213]   BP Pulse Resp Temp SpO2   135/70 80 16 98.2 °F (36.8 °C) 98 %      MAP       --          Physical Exam  Nursing Notes and Vital Signs Reviewed.  Constitutional: Patient is in no acute distress. Pale, ill-appearing.  Head: Atraumatic. Normocephalic.  Eyes: PERRL. EOM intact. Conjunctivae are not pale. No scleral icterus.  ENT: Mucous membranes are moist. Oropharynx is clear and symmetric.    Neck: Supple. Full ROM. No lymphadenopathy.  Cardiovascular: Regular rate. Regular rhythm. No murmurs, rubs, or gallops. Distal pulses are 2+ and  symmetric.  Pulmonary/Chest: No respiratory distress. Clear to auscultation bilaterally. No wheezing or rales.  Abdominal: Soft and non-distended.  Epigastric abdominal tenderness.  No rebound, guarding, or rigidity. Good bowel sounds.  Genitourinary: No CVA tenderness  Musculoskeletal: AV fistula, left arm. Moves all extremities. No obvious deformities. No edema. No calf tenderness. Wearing life vest  Skin: Warm and dry.  Neurological:  Alert, awake, and appropriate.  Normal speech.  No acute focal neurological deficits are appreciated.  Psychiatric: Normal affect. Good eye contact. Appropriate in content.     ED Course   Critical Care    Date/Time: 11/6/2024 3:42 PM    Performed by: Radha Winkler MD  Authorized by: Radha Winkler MD  Direct patient critical care time: 20 minutes  Additional history critical care time: 10 minutes  Ordering / reviewing critical care time: 5 minutes  Documentation critical care time: 5 minutes  Consulting other physicians critical care time: 5 minutes  Total critical care time (exclusive of procedural time) : 45 minutes  Critical care time was exclusive of separately billable procedures and treating other patients and teaching time.  Critical care was necessary to treat or prevent imminent or life-threatening deterioration of the following conditions: weakness, chest pain.  Critical care was time spent personally by me on the following activities: blood draw for specimens, development of treatment plan with patient or surrogate, discussions with consultants, interpretation of cardiac output measurements, evaluation of patient's response to treatment, examination of patient, obtaining history from patient or surrogate, ordering and performing treatments and interventions, ordering and review of laboratory studies, ordering and review of radiographic studies, pulse oximetry and re-evaluation of patient's condition.        ED Vital Signs:  Vitals:    11/06/24 1213 11/06/24  1230 11/06/24 1315 11/06/24 1320   BP: 135/70 (!) 143/70  (!) 149/67   Pulse: 80 82  84   Resp: 16 19 17 18   Temp: 98.2 °F (36.8 °C)      TempSrc: Oral      SpO2: 98% 100%  100%   Weight:    69.8 kg (153 lb 14.1 oz)    11/06/24 1345 11/06/24 1400 11/06/24 1500 11/06/24 1550   BP: (!) 144/69 (!) 146/74 (!) 168/77 (!) 154/78   Pulse: 75 78 69 71   Resp: 15 16 19 18   Temp: 98 °F (36.7 °C)      TempSrc: Oral      SpO2:  98% 100% 100%   Weight:           Abnormal Lab Results:  Labs Reviewed   CBC W/ AUTO DIFFERENTIAL - Abnormal       Result Value    WBC 5.93      RBC 3.31 (*)     Hemoglobin 10.7 (*)     Hematocrit 34.4 (*)      (*)     MCH 32.3 (*)     MCHC 31.1 (*)     RDW 18.0 (*)     Platelets 260      MPV 9.8      Immature Granulocytes 0.5      Gran # (ANC) 4.5      Immature Grans (Abs) 0.03      Lymph # 0.6 (*)     Mono # 0.6      Eos # 0.1      Baso # 0.03      nRBC 0      Gran % 76.5 (*)     Lymph % 10.5 (*)     Mono % 10.1      Eosinophil % 1.9      Basophil % 0.5      Differential Method Automated     COMPREHENSIVE METABOLIC PANEL - Abnormal    Sodium 139      Potassium 5.3 (*)     Chloride 97      CO2 23      Glucose 250 (*)     BUN 56 (*)     Creatinine 8.4 (*)     Calcium 8.5 (*)     Total Protein 6.9      Albumin 3.4 (*)     Total Bilirubin 0.5      Alkaline Phosphatase 81      AST 38       (*)     eGFR 6 (*)     Anion Gap 19 (*)    B-TYPE NATRIURETIC PEPTIDE - Abnormal    BNP >4,900 (*)    TROPONIN I - Abnormal    Troponin I 0.071 (*)    LIPASE    Lipase 17          All Lab Results:  Results for orders placed or performed during the hospital encounter of 11/06/24   EKG 12-lead    Collection Time: 11/06/24 12:17 PM   Result Value Ref Range    QRS Duration 108 ms    [facility] QTC Calculation 482 ms   CBC auto differential    Collection Time: 11/06/24 12:34 PM   Result Value Ref Range    WBC 5.93 3.90 - 12.70 K/uL    RBC 3.31 (L) 4.60 - 6.20 M/uL    Hemoglobin 10.7 (L) 14.0 - 18.0 g/dL    Hematocrit  34.4 (L) 40.0 - 54.0 %     (H) 82 - 98 fL    MCH 32.3 (H) 27.0 - 31.0 pg    MCHC 31.1 (L) 32.0 - 36.0 g/dL    RDW 18.0 (H) 11.5 - 14.5 %    Platelets 260 150 - 450 K/uL    MPV 9.8 9.2 - 12.9 fL    Immature Granulocytes 0.5 0.0 - 0.5 %    Gran # (ANC) 4.5 1.8 - 7.7 K/uL    Immature Grans (Abs) 0.03 0.00 - 0.04 K/uL    Lymph # 0.6 (L) 1.0 - 4.8 K/uL    Mono # 0.6 0.3 - 1.0 K/uL    Eos # 0.1 0.0 - 0.5 K/uL    Baso # 0.03 0.00 - 0.20 K/uL    nRBC 0 0 /100 WBC    Gran % 76.5 (H) 38.0 - 73.0 %    Lymph % 10.5 (L) 18.0 - 48.0 %    Mono % 10.1 4.0 - 15.0 %    Eosinophil % 1.9 0.0 - 8.0 %    Basophil % 0.5 0.0 - 1.9 %    Differential Method Automated    Comprehensive metabolic panel    Collection Time: 11/06/24 12:34 PM   Result Value Ref Range    Sodium 139 136 - 145 mmol/L    Potassium 5.3 (H) 3.5 - 5.1 mmol/L    Chloride 97 95 - 110 mmol/L    CO2 23 23 - 29 mmol/L    Glucose 250 (H) 70 - 110 mg/dL    BUN 56 (H) 8 - 23 mg/dL    Creatinine 8.4 (H) 0.5 - 1.4 mg/dL    Calcium 8.5 (L) 8.7 - 10.5 mg/dL    Total Protein 6.9 6.0 - 8.4 g/dL    Albumin 3.4 (L) 3.5 - 5.2 g/dL    Total Bilirubin 0.5 0.1 - 1.0 mg/dL    Alkaline Phosphatase 81 40 - 150 U/L    AST 38 10 - 40 U/L     (H) 10 - 44 U/L    eGFR 6 (A) >60 mL/min/1.73 m^2    Anion Gap 19 (H) 8 - 16 mmol/L   B-Type natriuretic peptide (BNP)    Collection Time: 11/06/24 12:34 PM   Result Value Ref Range    BNP >4,900 (H) 0 - 99 pg/mL   Troponin I    Collection Time: 11/06/24 12:34 PM   Result Value Ref Range    Troponin I 0.071 (H) 0.000 - 0.026 ng/mL   Lipase    Collection Time: 11/06/24  1:15 PM   Result Value Ref Range    Lipase 17 4 - 60 U/L         Imaging Results:  Imaging Results              CT Abdomen Pelvis With IV Contrast Routine Oral Contrast (Final result)  Result time 11/06/24 15:53:35      Final result by Jason Zhang MD (11/06/24 15:53:35)                   Impression:      1.  Mildly worsening ascites.    2. Interval improvement in loculated  fluid/pleural thickening involving the inferior lingular region.    3.  The appearance of the lower chest, abdomen and pelvis are otherwise unchanged.  Normal appendix.  Negative for free air.  Numerous stable findings as noted above.    All CT scans at this facility are performed  using dose modulation techniques as appropriate to performed exam including the following:  automated exposure control; adjustment of mA and/or kV according to the patients size (this includes techniques or standardized protocols for targeted exams where dose is matched to indication/reason for exam: i.e. extremities or head);  iterative reconstruction technique.      Electronically signed by: Jason Zhang MD  Date:    11/06/2024  Time:    15:53               Narrative:    EXAMINATION:  CT ABDOMEN PELVIS WITH IV CONTRAST, multiplanar reconstructions    CLINICAL HISTORY:  Abdominal pain, acute, nonlocalized;    TECHNIQUE:  Axial images through the abdomen and pelvis were obtained with the use of IV contrast.  Sagittal and coronal reconstructions are provided for review.  Oral contrast was utilized.    COMPARISON:  October 30, 2024    FINDINGS:  LUNG BASES: Right greater than left pleural effusions with adjacent atelectasis/consolidation again seen.  Interval improvement in pleural thickening/likely did pleural fluid within the inferior lingular region.  Stable adjacent atelectasis/consolidation in the lingula.  Lung bases are free of new pulmonary opacities.  Negative for pericardial effusions. The distal esophagus is normal.  Marked cardiac chamber enlargement again seen.  Stable median sternotomy wires, and coronary artery calcifications.    ABDOMEN: Worsening ascites.  Negative for free air.    Stable cholecystectomy clips.  Stable polycystic kidney disease changes with multiple simple and minimally complicated cysts measuring up to 2.3 cm.  Stable renal vascular calcifications.  Stable fatty infiltration of the liver.  The solid  organs are otherwise unchanged in appearance.    Negative for adenopathy noted within the abdomen or pelvis.  Vascular calcifications are present without aneurysmal changes. Portal vein is patent.    The bowel appears normal. Normal appendix.  Appropriate stool.  Negative for free air.    PELVIS: The urinary bladder is completely contracted.   The male pelvic organs are normal. There are pelvic phleboliths.    The abdominal wall is unchanged.    The osseous structures are unchanged.    Negative for groin adenopathy.                                       X-Ray Chest 1 View (Final result)  Result time 11/06/24 14:11:01      Final result by Jason Zhang MD (11/06/24 14:11:01)                   Impression:      1.  Interval improvement of pleural based opacity within the lateral left mid lung, versus small recurrent pleural thickening or loculated pleural effusion.    2.  Stable findings as noted above.  Negative for acute process otherwise.      Electronically signed by: Jason Zhang MD  Date:    11/06/2024  Time:    14:11               Narrative:    EXAMINATION:  XR CHEST 1 VIEW    CLINICAL HISTORY:  Weakness    COMPARISON:  August 21, 2024    FINDINGS:  Interval decrease without complete resolution of pleural based opacity within the lateral left mid lung.  Stable scarring in the bilateral lung bases.  The lungs are free of new pulmonary opacities.  Stable blunting of the left costophrenic angle.  The cardiac silhouette size is enlarged.  The trachea is midline and the mediastinal width is normal. Negative for right effusion or pneumothorax. Pulmonary vasculature is normal. Negative for osseous abnormalities. Median sternotomy wires and CABG changes.  Tortuous aorta with calcifications of the aortic knob.  There are degenerative changes of the spine and both shoulder girdles.                                       The EKG was ordered, reviewed, and independently interpreted by the ED provider.  Interpretation  time: 12:17  Rate: 77 BPM  Rhythm: normal sinus rhythm with frequent premature ventricular complexes  Interpretation: Septal infarct, age undetermined. ST and T wave abnormality, consider inferolateral ischemia. No STEMI.             The Emergency Provider reviewed the vital signs and test results, which are outlined above.     ED Discussion       4:41 PM: Reassessed pt at this time. Discussed with pt all pertinent ED information and results. Discussed pt dx and plan of tx. Gave pt all f/u and return to the ED instructions. All questions and concerns were addressed at this time. Pt expresses understanding of information and instructions, and is comfortable with plan to discharge. Pt is stable for discharge.    I discussed with patient and/or family/caretaker that evaluation in the ED does not suggest any emergent or life threatening medical conditions requiring immediate intervention beyond what was provided in the ED, and I believe patient is safe for discharge.  Regardless, an unremarkable evaluation in the ED does not preclude the development or presence of a serious of life threatening condition. As such, patient was instructed to return immediately for any worsening or change in current symptoms.    4:42 PM: Discussed pt's case with Emily Anderson NP who recommends a dose of 10 lokelma prior to discharge.    4:57 PM: Discussed pt's case with Dr. Mccallum (Nephrology) who recommends pt be taken of prochlorperazine.         Medical Decision Making  63 yo male was going to be dialyzed today when he c/o weakness and was told by dialysis center that he couldn't be dialyzed and would need to go to ER.  He is wearing a life vest after having 2 separate cardiac arrest episodes during dialysis for which he was hospitalized x 1 month.  His wife says he doesn't like to wear the vest because it is heavy, fits too tightly and he is having upper abdominal pain and vest causes pressure and pain on this area.  He continues  to have vomiting, last episode this am.  His last dialysis was on Monday.  Patient says he always wears life vest during dialysis after 2 previous codes during dialysis. Troponin .071, bnp greater than 4900 both appear to be at his baseline.  K+ 5.3   CT abd/pelvis:  Mildly worsening ascites.   2. Interval improvement in loculated fluid/pleural thickening involving the inferior lingular region.   HM and Dr. Mccallum consulted and he recommended patient stopped taking prochlorperazine which is making him weak and not helping his nausea.  He is instructed to take zofran instead.  He should follow up Friday for dialysis.      Amount and/or Complexity of Data Reviewed  Independent Historian: spouse  External Data Reviewed: labs, radiology, ECG and notes.  Labs: ordered. Decision-making details documented in ED Course.  Radiology: ordered and independent interpretation performed. Decision-making details documented in ED Course.  ECG/medicine tests: ordered and independent interpretation performed. Decision-making details documented in ED Course.    Risk  OTC drugs.  Prescription drug management.  Parenteral controlled substances.  Decision regarding hospitalization.    Critical Care  Total time providing critical care: 45 minutes                ED Medication(s):  Medications   ondansetron injection 4 mg (4 mg Intravenous Given 11/6/24 1310)   morphine injection 4 mg (4 mg Intravenous Given 11/6/24 1315)   iohexoL (OMNIPAQUE 350) injection 100 mL (100 mLs Intravenous Given 11/6/24 1527)   iohexoL (OMNIPAQUE 350) injection 30 mL (30 mLs Oral Given 11/6/24 1527)   sodium zirconium cyclosilicate packet 10 g (10 g Oral Given 11/6/24 1650)       New Prescriptions    No medications on file               Scribe Attestation:   Scribe #1: I performed the above scribed service and the documentation accurately describes the services I performed. I attest to the accuracy of the note.     Attending:   Physician Attestation Statement for  Scribe #1: I, Radha Winkler MD, personally performed the services described in this documentation, as scribed by Tristan Blair, in my presence, and it is both accurate and complete.           Clinical Impression       ICD-10-CM ICD-9-CM   1. Weakness  R53.1 780.79   2. Epigastric pain  R10.13 789.06       Disposition:   Disposition: Discharged  Condition: Stable        Radha Winkler MD  11/09/24 0625

## 2024-11-07 LAB
OHS QRS DURATION: 108 MS
OHS QTC CALCULATION: 482 MS

## 2024-11-07 NOTE — CONSULTS
"[facility] - Emergency Dept.  Nephrology  Consult Note      Patient Name: Silvino Robles  MRN: [id number]  Admission Date: 11/6/2024  Hospital Length of Stay: 0 days  Attending Provider: No att. providers found   Primary Care Physician: No, Primary Doctor  Principal Problem:<principal problem not specified>    Reason for consult: ESRD    Consults  Subjective:     HPI: Thank you for referring the pt to us. H/o and chart were reviewed. Pt was seen and examined. Pt is a 65 y/o male with h/o of ESRD on chronic HD x 8 years, at [facility] with Dr. Knowles, who was referred to ER today by the HD unit after he arrived for HD but refused to get off his car, because he was feeling too weak. HD unit was called and more details were obtained. Pt's wife is present in ER. Per wife and pt, he has been having more food intolerance x 3 months. When he eats, he gets nauseated. Therefore, he has been eating less, and has lost some wt. In the past 6 weeks, he has become more nauseated at the end of his treatment with HD. His DW has remained unadjusted despite change in body wt. He was given chlorpromazine to help with nausea, which has caused in him considerable drowsiness and weakness. In ER today, he feels "well", no c/o's, no symptoms, no GI discomfort, no CP, no SOB.    Past Medical History:   Diagnosis Date    Chronic kidney disease     Diabetes mellitus     Diabetes mellitus, type 2     Hyperlipidemia     Hypertension     Other specified anemias 07/24/2024    PVD (peripheral vascular disease) 08/15/2024       Past Surgical History:   Procedure Laterality Date    ANGIOGRAM, ABDOMINAL AORTA W/ EXTREMITY RUNOFF N/A 7/23/2024    Procedure: Angiogram, Abdominal Aorta W/ Extremity Runoff;  Surgeon: Solis Carr MD;  Location: [facility] CATH LAB;  Service: Cardiology;  Laterality: N/A;    ANGIOGRAM, CORONARY, WITH LEFT HEART CATHETERIZATION N/A 7/22/2024    Procedure: Angiogram, Coronary, with Left Heart Cath;  Surgeon: Bruno" Solis VICENTE MD;  Location: [facility] CATH LAB;  Service: Cardiology;  Laterality: N/A;    ANGIOGRAM, LOWER ARTERIAL, BILATERAL  7/22/2024    Procedure: Angiogram, Lower Arterial, Bilateral;  Surgeon: Solis Carr MD;  Location: [facility] CATH LAB;  Service: Cardiology;;    ANGIOGRAM, LOWER ARTERIAL, UNILATERAL  7/23/2024    Procedure: Angiogram, Lower Arterial, Unilateral;  Surgeon: Solis Carr MD;  Location: [facility] CATH LAB;  Service: Cardiology;;    ARTERIOGRAPHY OF SUBCLAVIAN ARTERY  7/22/2024    Procedure: ARTERIOGRAM, SUBCLAVIAN;  Surgeon: Solis Carr MD;  Location: [facility] CATH LAB;  Service: Cardiology;;    ARTERIOGRAPHY OF SUBCLAVIAN ARTERY Left 7/23/2024    Procedure: ARTERIOGRAM, SUBCLAVIAN;  Surgeon: Solis Carr MD;  Location: [facility] CATH LAB;  Service: Cardiology;  Laterality: Left;    CORONARY BYPASS GRAFT ANGIOGRAPHY  7/22/2024    Procedure: Bypass graft study;  Surgeon: Solis Carr MD;  Location: [facility] CATH LAB;  Service: Cardiology;;    EYE SURGERY      INSERTION OF INTRAVASCULAR MICROAXIAL BLOOD PUMP N/A 7/22/2024    Procedure: INSERTION, IMPELLA;  Surgeon: Solis Carr MD;  Location: [facility] CATH LAB;  Service: Cardiology;  Laterality: N/A;    INTRAVASCULAR ULTRASOUND, NON-CORONARY  7/23/2024    Procedure: Intravascular Ultrasound, Non-Coronary;  Surgeon: Solis Carr MD;  Location: [facility] CATH LAB;  Service: Cardiology;;    PTA, SUBCLAVIAN  7/23/2024    Procedure: PTA, Subclavian;  Surgeon: Solis Carr MD;  Location: [facility] CATH LAB;  Service: Cardiology;;    REVASCULARIZATION, ENDOVASCULAR, LE W/ INTRAVASCULAR LITHOTRIPSY AND STENT PLACEMENT Left 7/23/2024    Procedure: REVASCULARIZATION, ENDOVASCULAR, LE W/ INTRAVASCULAR LITHOTRIPSY AND STENT PLACEMENT;  Surgeon: Solis Carr MD;  Location: [facility] CATH LAB;  Service: Cardiology;  Laterality: Left;  L subclavian artery    RIGHT HEART CATHETERIZATION Right 7/23/2024    Procedure: INSERTION, CATHETER, RIGHT HEART;  Surgeon: Solis Carr  MD JULES;  Location: [facility] CATH LAB;  Service: Cardiology;  Laterality: Right;    STENT, SUBCLAVIAN Left 7/23/2024    Procedure: Stent, Subclavian;  Surgeon: Solis Carr MD;  Location: [facility] CATH LAB;  Service: Cardiology;  Laterality: Left;       Review of patient's allergies indicates:   Allergen Reactions    Adhesive tape-silicones Rash     No current facility-administered medications for this encounter.     Current Outpatient Medications   Medication    amiodarone (PACERONE) 200 MG Tab    amLODIPine (NORVASC) 5 MG tablet    apixaban (ELIQUIS) 2.5 mg Tab    aspirin 81 MG Chew    atorvastatin (LIPITOR) 40 MG tablet    clopidogreL (PLAVIX) 75 mg tablet    metoprolol tartrate (LOPRESSOR) 25 MG tablet    ondansetron (ZOFRAN-ODT) 8 MG TbDL    pantoprazole (PROTONIX) 40 MG tablet    QUEtiapine (SEROQUEL) 25 MG Tab    sacubitriL-valsartan (ENTRESTO) 24-26 mg per tablet    sevelamer carbonate (RENVELA) 800 mg Tab    ondansetron (ZOFRAN) 4 MG tablet     Family History       Problem Relation (Age of Onset)    Diabetes Father, Sister, Brother          Tobacco Use    Smoking status: Never    Smokeless tobacco: Never   Substance and Sexual Activity    Alcohol use: No    Drug use: No    Sexual activity: Never     Review of Systems   Constitutional: Negative.    HENT: Negative.     Respiratory: Negative.     Cardiovascular: Negative.    Gastrointestinal:  Positive for nausea.   Genitourinary: Negative.    Musculoskeletal: Negative.    Neurological: Negative.    Psychiatric/Behavioral: Negative.       Objective:     Vital Signs (Most Recent):  Temp: 97.6 °F (36.4 °C) (11/06/24 1720)  Pulse: 67 (11/06/24 1720)  Resp: 13 (11/06/24 1720)  BP: (!) 161/77 (11/06/24 1720)  SpO2: 100 % (11/06/24 1720) Vital Signs (24h Range):  Temp:  [97.6 °F (36.4 °C)-98.2 °F (36.8 °C)] 97.6 °F (36.4 °C)  Pulse:  [67-84] 67  Resp:  [13-19] 13  SpO2:  [98 %-100 %] 100 %  BP: (135-168)/(67-78) 161/77     Weight: 69.8 kg (153 lb 14.1 oz) (11/06/24  "1320)  Body mass index is 24.1 kg/m².  Body surface area is 1.82 meters squared.    No intake/output data recorded.     Physical Exam  Vitals and nursing note reviewed.   Constitutional:       Appearance: Normal appearance.   HENT:      Head: Normocephalic and atraumatic.   Cardiovascular:      Rate and Rhythm: Normal rate and regular rhythm.      Pulses: Normal pulses.      Heart sounds: Normal heart sounds.   Pulmonary:      Effort: Pulmonary effort is normal.      Breath sounds: Normal breath sounds.   Abdominal:      General: There is no distension.      Palpations: Abdomen is soft.      Tenderness: There is no abdominal tenderness. There is no guarding or rebound.   Musculoskeletal:      Right lower leg: No edema.      Left lower leg: No edema.   Neurological:      Mental Status: He is alert and oriented to person, place, and time.   Psychiatric:         Behavior: Behavior normal.          Significant Labs: reviewed  BMP  Lab Results   Component Value Date     11/06/2024    K 5.3 (H) 11/06/2024    CL 97 11/06/2024    CO2 23 11/06/2024    BUN 56 (H) 11/06/2024    CREATININE 8.4 (H) 11/06/2024    CALCIUM 8.5 (L) 11/06/2024    ANIONGAP 19 (H) 11/06/2024    EGFRNORACEVR 6 (A) 11/06/2024     Lab Results   Component Value Date    WBC 5.93 11/06/2024    HGB 10.7 (L) 11/06/2024    HCT 34.4 (L) 11/06/2024     (H) 11/06/2024     11/06/2024         Significant Imaging: reviewed      Assessment/Plan:     64 yt/o male with ESRD on chronic HD presented with weakness:    ESRD (end stage renal disease)  ESRD pt on chronic HD x 8 years, at [facility]  Has tolerated HD well except in the past 102 months, due to nausea at end of HD  Has lost "good" wt, without adjustment in DW  Normally, DW (dry weight = the wt at which pt feels comfortable) needs to be adjusted in the same direction as "good" wt gain or loss.    Pt's symptoms of nausea arise from likely GI related (post-prandial symptoms) + XS UF during " HD  In addition, weakness and drowsiness are side effects of chlorpromazine    Recommend:  D/c chlorpromazine  Use zofran prn instead  Adjust dialysis DW, I spoke with HD nurse at outpt unit  See primary care for suspected peptic ulcer disease  Pepcid OTC  Nexium or prilosec OTC  F/u with HD unit, no clear indication for admission  Lokelma x 1 in ER    Nausea  As above  Likely secondary to XS UF with HD and PUD    Weakness  As discussed above  Side effect of chlorpromazine      Plans and recommendations:  As discussed above  Total time spent 70 minutes including time needed to review the records, the   patient evaluation, documentation, face-to-face discussion with the patient,   more than 50% of the time was spent on coordination of care and counseling.    Level V visit.        Thank you for your consult.     Karla Mccallum MD   Nephrology  [facility] - Emergency Dept.

## 2024-11-07 NOTE — HPI
"Thank you for referring the pt to us. H/o and chart were reviewed. Pt was seen and examined. Pt is a 65 y/o male with h/o of ESRD on chronic HD x 8 years, at [facility] with Dr. Knowles, who was referred to ER today by the HD unit after he arrived for HD but refused to get off his car, because he was feeling too weak. HD unit was called and more details were obtained. Pt's wife is present in ER. Per wife and pt, he has been having more food intolerance x 3 months. When he eats, he gets nauseated. Therefore, he has been eating less, and has lost some wt. In the past 6 weeks, he has become more nauseated at the end of his treatment with HD. His DW has remained unadjusted despite change in body wt. He was given chlorpromazine to help with nausea, which has caused in him considerable drowsiness and weakness. In ER today, he feels "well", no c/o's, no symptoms, no GI discomfort, no CP, no SOB.  "

## 2024-11-07 NOTE — ASSESSMENT & PLAN NOTE
"ESRD pt on chronic HD x 8 years, at [facility]  Has tolerated HD well except in the past 102 months, due to nausea at end of HD  Has lost "good" wt, without adjustment in DW  Normally, DW (dry weight = the wt at which pt feels comfortable) needs to be adjusted in the same direction as "good" wt gain or loss.    Pt's symptoms of nausea arise from likely GI related (post-prandial symptoms) + XS UF during HD  In addition, weakness and drowsiness are side effects of chlorpromazine    Recommend:  D/c chlorpromazine  Use zofran prn instead  Adjust dialysis DW, I spoke with HD nurse at outpt unit  See primary care for suspected peptic ulcer disease  Pepcid OTC  Nexium or prilosec OTC  F/u with HD unit    "

## 2024-11-07 NOTE — SUBJECTIVE & OBJECTIVE
Past Medical History:   Diagnosis Date    Chronic kidney disease     Diabetes mellitus     Diabetes mellitus, type 2     Hyperlipidemia     Hypertension     Other specified anemias 07/24/2024    PVD (peripheral vascular disease) 08/15/2024       Past Surgical History:   Procedure Laterality Date    ANGIOGRAM, ABDOMINAL AORTA W/ EXTREMITY RUNOFF N/A 7/23/2024    Procedure: Angiogram, Abdominal Aorta W/ Extremity Runoff;  Surgeon: Solis Carr MD;  Location: [facility] CATH LAB;  Service: Cardiology;  Laterality: N/A;    ANGIOGRAM, CORONARY, WITH LEFT HEART CATHETERIZATION N/A 7/22/2024    Procedure: Angiogram, Coronary, with Left Heart Cath;  Surgeon: Solis Carr MD;  Location: [facility] CATH LAB;  Service: Cardiology;  Laterality: N/A;    ANGIOGRAM, LOWER ARTERIAL, BILATERAL  7/22/2024    Procedure: Angiogram, Lower Arterial, Bilateral;  Surgeon: Solis Carr MD;  Location: [facility] CATH LAB;  Service: Cardiology;;    ANGIOGRAM, LOWER ARTERIAL, UNILATERAL  7/23/2024    Procedure: Angiogram, Lower Arterial, Unilateral;  Surgeon: Solis Carr MD;  Location: [facility] CATH LAB;  Service: Cardiology;;    ARTERIOGRAPHY OF SUBCLAVIAN ARTERY  7/22/2024    Procedure: ARTERIOGRAM, SUBCLAVIAN;  Surgeon: Solis Carr MD;  Location: [facility] CATH LAB;  Service: Cardiology;;    ARTERIOGRAPHY OF SUBCLAVIAN ARTERY Left 7/23/2024    Procedure: ARTERIOGRAM, SUBCLAVIAN;  Surgeon: Solis Carr MD;  Location: [facility] CATH LAB;  Service: Cardiology;  Laterality: Left;    CORONARY BYPASS GRAFT ANGIOGRAPHY  7/22/2024    Procedure: Bypass graft study;  Surgeon: Solis Carr MD;  Location: [facility] CATH LAB;  Service: Cardiology;;    EYE SURGERY      INSERTION OF INTRAVASCULAR MICROAXIAL BLOOD PUMP N/A 7/22/2024    Procedure: INSERTION, IMPELLA;  Surgeon: Solis Carr MD;  Location: [facility] CATH LAB;  Service: Cardiology;  Laterality: N/A;    INTRAVASCULAR ULTRASOUND, NON-CORONARY  7/23/2024    Procedure: Intravascular Ultrasound,  Non-Coronary;  Surgeon: Solis Carr MD;  Location: [facility] CATH LAB;  Service: Cardiology;;    PTA, SUBCLAVIAN  7/23/2024    Procedure: PTA, Subclavian;  Surgeon: Solis Carr MD;  Location: [facility] CATH LAB;  Service: Cardiology;;    REVASCULARIZATION, ENDOVASCULAR, LE W/ INTRAVASCULAR LITHOTRIPSY AND STENT PLACEMENT Left 7/23/2024    Procedure: REVASCULARIZATION, ENDOVASCULAR, LE W/ INTRAVASCULAR LITHOTRIPSY AND STENT PLACEMENT;  Surgeon: Solis Carr MD;  Location: [facility] CATH LAB;  Service: Cardiology;  Laterality: Left;  L subclavian artery    RIGHT HEART CATHETERIZATION Right 7/23/2024    Procedure: INSERTION, CATHETER, RIGHT HEART;  Surgeon: Solis Carr MD;  Location: [facility] CATH LAB;  Service: Cardiology;  Laterality: Right;    STENT, SUBCLAVIAN Left 7/23/2024    Procedure: Stent, Subclavian;  Surgeon: Solis Carr MD;  Location: [facility] CATH LAB;  Service: Cardiology;  Laterality: Left;       Review of patient's allergies indicates:   Allergen Reactions    Adhesive tape-silicones Rash     No current facility-administered medications for this encounter.     Current Outpatient Medications   Medication    amiodarone (PACERONE) 200 MG Tab    amLODIPine (NORVASC) 5 MG tablet    apixaban (ELIQUIS) 2.5 mg Tab    aspirin 81 MG Chew    atorvastatin (LIPITOR) 40 MG tablet    clopidogreL (PLAVIX) 75 mg tablet    metoprolol tartrate (LOPRESSOR) 25 MG tablet    ondansetron (ZOFRAN-ODT) 8 MG TbDL    pantoprazole (PROTONIX) 40 MG tablet    QUEtiapine (SEROQUEL) 25 MG Tab    sacubitriL-valsartan (ENTRESTO) 24-26 mg per tablet    sevelamer carbonate (RENVELA) 800 mg Tab    ondansetron (ZOFRAN) 4 MG tablet     Family History       Problem Relation (Age of Onset)    Diabetes Father, Sister, Brother          Tobacco Use    Smoking status: Never    Smokeless tobacco: Never   Substance and Sexual Activity    Alcohol use: No    Drug use: No    Sexual activity: Never     Review of Systems   Constitutional: Negative.     HENT: Negative.     Respiratory: Negative.     Cardiovascular: Negative.    Gastrointestinal:  Positive for nausea.   Genitourinary: Negative.    Musculoskeletal: Negative.    Neurological: Negative.    Psychiatric/Behavioral: Negative.       Objective:     Vital Signs (Most Recent):  Temp: 97.6 °F (36.4 °C) (11/06/24 1720)  Pulse: 67 (11/06/24 1720)  Resp: 13 (11/06/24 1720)  BP: (!) 161/77 (11/06/24 1720)  SpO2: 100 % (11/06/24 1720) Vital Signs (24h Range):  Temp:  [97.6 °F (36.4 °C)-98.2 °F (36.8 °C)] 97.6 °F (36.4 °C)  Pulse:  [67-84] 67  Resp:  [13-19] 13  SpO2:  [98 %-100 %] 100 %  BP: (135-168)/(67-78) 161/77     Weight: 69.8 kg (153 lb 14.1 oz) (11/06/24 1320)  Body mass index is 24.1 kg/m².  Body surface area is 1.82 meters squared.    No intake/output data recorded.     Physical Exam  Vitals and nursing note reviewed.   Constitutional:       Appearance: Normal appearance.   HENT:      Head: Normocephalic and atraumatic.   Cardiovascular:      Rate and Rhythm: Normal rate and regular rhythm.      Pulses: Normal pulses.      Heart sounds: Normal heart sounds.   Pulmonary:      Effort: Pulmonary effort is normal.      Breath sounds: Normal breath sounds.   Abdominal:      General: There is no distension.      Palpations: Abdomen is soft.      Tenderness: There is no abdominal tenderness. There is no guarding or rebound.   Musculoskeletal:      Right lower leg: No edema.      Left lower leg: No edema.   Neurological:      Mental Status: He is alert and oriented to person, place, and time.   Psychiatric:         Behavior: Behavior normal.          Significant Labs: reviewed  BMP  Lab Results   Component Value Date     11/06/2024    K 5.3 (H) 11/06/2024    CL 97 11/06/2024    CO2 23 11/06/2024    BUN 56 (H) 11/06/2024    CREATININE 8.4 (H) 11/06/2024    CALCIUM 8.5 (L) 11/06/2024    ANIONGAP 19 (H) 11/06/2024    EGFRNORACEVR 6 (A) 11/06/2024     Lab Results   Component Value Date    WBC 5.93 11/06/2024     HGB 10.7 (L) 11/06/2024    HCT 34.4 (L) 11/06/2024     (H) 11/06/2024     11/06/2024         Significant Imaging: reviewed

## 2024-11-22 ENCOUNTER — DOCUMENT SCAN (OUTPATIENT)
Dept: HOME HEALTH SERVICES | Facility: HOSPITAL | Age: 64
End: 2024-11-22
Payer: MEDICARE

## 2025-01-07 ENCOUNTER — TELEPHONE (OUTPATIENT)
Dept: CARDIOLOGY | Facility: CLINIC | Age: 65
End: 2025-01-07
Payer: MEDICARE

## 2025-01-07 NOTE — TELEPHONE ENCOUNTER
CALLED - NO ANSWER- COULD NOT LEAVE MESSAGE- NO MYCHART----- Message from Gi sent at 1/7/2025  3:34 PM CST -----  Contact: self  Type: Appointment Access    Who called: Silvino Robles  Reason for visit: f/u  When does the patient need to be seen?: asap  Next Available Appointment: blocked  Would the patient rather a call back or a response via [facility]?: call back  Best Call Back Number: [phone]  Additional Information: n/a

## 2025-01-09 ENCOUNTER — TELEPHONE (OUTPATIENT)
Dept: CARDIOLOGY | Facility: CLINIC | Age: 65
End: 2025-01-09
Payer: MEDICARE

## 2025-01-09 NOTE — TELEPHONE ENCOUNTER
Called and left a message to call back to schedule.       ----- Message from Kitty sent at 1/9/2025 12:20 PM CST -----  Regarding: Appointment  Contact: patient  Per phone call with patient he would like to schedule an appointment to see the physician regarding a follow up.  Please return call at [phone] (home).    Thanks,  SJ

## 2025-01-13 ENCOUNTER — TELEPHONE (OUTPATIENT)
Dept: CARDIOLOGY | Facility: CLINIC | Age: 65
End: 2025-01-13
Payer: MEDICARE

## 2025-01-13 NOTE — TELEPHONE ENCOUNTER
YAS for pt to call us back      ----- Message from Caryl sent at 1/13/2025 12:03 PM CST -----  Contact: Camilo/wife  Pt wife is calling in regards to a hospital f/u.please call back .[phone]       Thanks  TYLOR

## 2025-02-05 ENCOUNTER — EXTERNAL HOME HEALTH (OUTPATIENT)
Dept: HOME HEALTH SERVICES | Facility: HOSPITAL | Age: 65
End: 2025-02-05
Payer: MEDICARE